# Patient Record
Sex: MALE | Race: WHITE | HISPANIC OR LATINO | URBAN - METROPOLITAN AREA
[De-identification: names, ages, dates, MRNs, and addresses within clinical notes are randomized per-mention and may not be internally consistent; named-entity substitution may affect disease eponyms.]

---

## 2018-11-19 ENCOUNTER — INPATIENT (INPATIENT)
Facility: HOSPITAL | Age: 48
LOS: 2 days | Discharge: HOME | End: 2018-11-22
Attending: INTERNAL MEDICINE | Admitting: INTERNAL MEDICINE
Payer: COMMERCIAL

## 2018-11-19 VITALS
OXYGEN SATURATION: 98 % | DIASTOLIC BLOOD PRESSURE: 121 MMHG | HEART RATE: 112 BPM | SYSTOLIC BLOOD PRESSURE: 159 MMHG | TEMPERATURE: 98 F | RESPIRATION RATE: 20 BRPM

## 2018-11-19 DIAGNOSIS — Z98.890 OTHER SPECIFIED POSTPROCEDURAL STATES: Chronic | ICD-10-CM

## 2018-11-19 DIAGNOSIS — Z29.9 ENCOUNTER FOR PROPHYLACTIC MEASURES, UNSPECIFIED: ICD-10-CM

## 2018-11-19 DIAGNOSIS — I10 ESSENTIAL (PRIMARY) HYPERTENSION: ICD-10-CM

## 2018-11-19 DIAGNOSIS — I50.9 HEART FAILURE, UNSPECIFIED: ICD-10-CM

## 2018-11-19 LAB
ALBUMIN SERPL ELPH-MCNC: 4.3 G/DL — SIGNIFICANT CHANGE UP (ref 3.3–5)
ALP SERPL-CCNC: 71 U/L — SIGNIFICANT CHANGE UP (ref 40–120)
ALT FLD-CCNC: 31 U/L — SIGNIFICANT CHANGE UP (ref 4–41)
AST SERPL-CCNC: 43 U/L — HIGH (ref 4–40)
BASOPHILS # BLD AUTO: 0.01 K/UL — SIGNIFICANT CHANGE UP (ref 0–0.2)
BASOPHILS NFR BLD AUTO: 0.1 % — SIGNIFICANT CHANGE UP (ref 0–2)
BILIRUB SERPL-MCNC: 2 MG/DL — HIGH (ref 0.2–1.2)
BUN SERPL-MCNC: 11 MG/DL — SIGNIFICANT CHANGE UP (ref 7–23)
CALCIUM SERPL-MCNC: 9.6 MG/DL — SIGNIFICANT CHANGE UP (ref 8.4–10.5)
CHLORIDE SERPL-SCNC: 102 MMOL/L — SIGNIFICANT CHANGE UP (ref 98–107)
CO2 SERPL-SCNC: 26 MMOL/L — SIGNIFICANT CHANGE UP (ref 22–31)
CREAT SERPL-MCNC: 1.21 MG/DL — SIGNIFICANT CHANGE UP (ref 0.5–1.3)
EOSINOPHIL # BLD AUTO: 0.02 K/UL — SIGNIFICANT CHANGE UP (ref 0–0.5)
EOSINOPHIL NFR BLD AUTO: 0.3 % — SIGNIFICANT CHANGE UP (ref 0–6)
GLUCOSE SERPL-MCNC: 112 MG/DL — HIGH (ref 70–99)
HCT VFR BLD CALC: 46.7 % — SIGNIFICANT CHANGE UP (ref 39–50)
HGB BLD-MCNC: 15.7 G/DL — SIGNIFICANT CHANGE UP (ref 13–17)
IMM GRANULOCYTES # BLD AUTO: 0.02 # — SIGNIFICANT CHANGE UP
IMM GRANULOCYTES NFR BLD AUTO: 0.3 % — SIGNIFICANT CHANGE UP (ref 0–1.5)
LYMPHOCYTES # BLD AUTO: 1.22 K/UL — SIGNIFICANT CHANGE UP (ref 1–3.3)
LYMPHOCYTES # BLD AUTO: 17.8 % — SIGNIFICANT CHANGE UP (ref 13–44)
MCHC RBC-ENTMCNC: 33.6 % — SIGNIFICANT CHANGE UP (ref 32–36)
MCHC RBC-ENTMCNC: 36.9 PG — HIGH (ref 27–34)
MCV RBC AUTO: 109.9 FL — HIGH (ref 80–100)
MONOCYTES # BLD AUTO: 0.35 K/UL — SIGNIFICANT CHANGE UP (ref 0–0.9)
MONOCYTES NFR BLD AUTO: 5.1 % — SIGNIFICANT CHANGE UP (ref 2–14)
NEUTROPHILS # BLD AUTO: 5.23 K/UL — SIGNIFICANT CHANGE UP (ref 1.8–7.4)
NEUTROPHILS NFR BLD AUTO: 76.4 % — SIGNIFICANT CHANGE UP (ref 43–77)
NRBC # FLD: 0 — SIGNIFICANT CHANGE UP
NT-PROBNP SERPL-SCNC: SIGNIFICANT CHANGE UP PG/ML
PLATELET # BLD AUTO: 193 K/UL — SIGNIFICANT CHANGE UP (ref 150–400)
PMV BLD: 10.1 FL — SIGNIFICANT CHANGE UP (ref 7–13)
POTASSIUM SERPL-MCNC: 3.8 MMOL/L — SIGNIFICANT CHANGE UP (ref 3.5–5.3)
POTASSIUM SERPL-SCNC: 3.8 MMOL/L — SIGNIFICANT CHANGE UP (ref 3.5–5.3)
PROT SERPL-MCNC: 7.5 G/DL — SIGNIFICANT CHANGE UP (ref 6–8.3)
RBC # BLD: 4.25 M/UL — SIGNIFICANT CHANGE UP (ref 4.2–5.8)
RBC # FLD: 16.2 % — HIGH (ref 10.3–14.5)
SODIUM SERPL-SCNC: 141 MMOL/L — SIGNIFICANT CHANGE UP (ref 135–145)
TROPONIN T, HIGH SENSITIVITY: 38 NG/L — SIGNIFICANT CHANGE UP (ref ?–14)
WBC # BLD: 6.85 K/UL — SIGNIFICANT CHANGE UP (ref 3.8–10.5)
WBC # FLD AUTO: 6.85 K/UL — SIGNIFICANT CHANGE UP (ref 3.8–10.5)

## 2018-11-19 PROCEDURE — 71045 X-RAY EXAM CHEST 1 VIEW: CPT | Mod: 26

## 2018-11-19 RX ORDER — CARVEDILOL PHOSPHATE 80 MG/1
6.25 CAPSULE, EXTENDED RELEASE ORAL EVERY 12 HOURS
Qty: 0 | Refills: 0 | Status: DISCONTINUED | OUTPATIENT
Start: 2018-11-19 | End: 2018-11-22

## 2018-11-19 RX ORDER — ZOLPIDEM TARTRATE 10 MG/1
1 TABLET ORAL
Qty: 0 | Refills: 0 | COMMUNITY

## 2018-11-19 RX ORDER — HEPARIN SODIUM 5000 [USP'U]/ML
5000 INJECTION INTRAVENOUS; SUBCUTANEOUS EVERY 8 HOURS
Qty: 0 | Refills: 0 | Status: DISCONTINUED | OUTPATIENT
Start: 2018-11-19 | End: 2018-11-22

## 2018-11-19 RX ORDER — ZOLPIDEM TARTRATE 10 MG/1
5 TABLET ORAL AT BEDTIME
Qty: 0 | Refills: 0 | Status: DISCONTINUED | OUTPATIENT
Start: 2018-11-19 | End: 2018-11-22

## 2018-11-19 RX ORDER — FUROSEMIDE 40 MG
20 TABLET ORAL
Qty: 0 | Refills: 0 | Status: DISCONTINUED | OUTPATIENT
Start: 2018-11-19 | End: 2018-11-20

## 2018-11-19 RX ORDER — NICOTINE POLACRILEX 2 MG
1 GUM BUCCAL DAILY
Qty: 0 | Refills: 0 | Status: DISCONTINUED | OUTPATIENT
Start: 2018-11-19 | End: 2018-11-22

## 2018-11-19 RX ADMIN — CARVEDILOL PHOSPHATE 6.25 MILLIGRAM(S): 80 CAPSULE, EXTENDED RELEASE ORAL at 23:48

## 2018-11-19 NOTE — ED ADULT NURSE NOTE - CHIEF COMPLAINT QUOTE
pt went to cardiologist today for check up and was sent for LV dysfunction. denies chest pain at this time. c.o sob and BLE edema. ambulatory in triage. pmh htn gout

## 2018-11-19 NOTE — H&P ADULT - ASSESSMENT
47 y/o m with h/o HTN presents to the ED for shortness of breath X 3 weeks. Admit to telemetry for acute CHF.

## 2018-11-19 NOTE — H&P ADULT - NSHPLABSRESULTS_GEN_ALL_CORE
LABS:                        15.7   6.85  )-----------( 193      ( 19 Nov 2018 22:00 )             46.7     11-19    141  |  102  |  11  ----------------------------<  112<H>  3.8   |  26  |  1.21    Ca    9.6      19 Nov 2018 22:00    TPro  7.5  /  Alb  4.3  /  TBili  2.0<H>  /  DBili  x   /  AST  43<H>  /  ALT  31  /  AlkPhos  71  11-19        CAPILLARY BLOOD GLUCOSE    EKG shows sinus tachycardia with PVC TWI in AVL

## 2018-11-19 NOTE — H&P ADULT - HISTORY OF PRESENT ILLNESS
49 y/o m with h/o HTN presents to the ED for shortness of breath X 3 weeks. Pt states he has been having shortness of breath when he ties his shoes and with exertion. Pt denies orthopnea or PND. Pt reports he saw his cardiologist today and on the TTE they found have to have new severe LV dysfunction. Pt has swelling in his lower extremities. As per wife, patient has a diet high in salt, drinks and smokes daily and doesn't take his medications. Pt denies LOC, syncope,fever, chills, chest pain, palpitations, dizziness, numbness, tingling, dysuria, urinary/bowel incontinence or any other complaints at this time.

## 2018-11-19 NOTE — ED PROVIDER NOTE - OBJECTIVE STATEMENT
48 Y M with hx of untreated HTN who present with volume overload, SOB, LE edema x 3 weeks. He went to PCP and was eventually diagnosed with LV dysfunction by cardiology. He denies any chest pain, nausea, vomiting, LOC. He says that he only has SOB when he bends over to tie his shoes and when he is walking around. He denies hx of cardiac problems prior to this. He was just started on BP meds sat.

## 2018-11-19 NOTE — ED PROVIDER NOTE - MEDICAL DECISION MAKING DETAILS
48 Y M with new CHF from presumed LV dysfuction sent by cardiologist for admission. Will admit to tele

## 2018-11-19 NOTE — ED ADULT TRIAGE NOTE - CHIEF COMPLAINT QUOTE
pt went to cardiologist today for check up and was sent for LV dysfunction. denies chest pain at this time. c.o sob and BLE edema. ambualtory in triage. pmh htn gout

## 2018-11-19 NOTE — ED PROVIDER NOTE - NOTES
Will accept admission, would like basic labs plus troponin and BNP. Will plan for cath this week. Ok to start coreg x 1 dose now.

## 2018-11-19 NOTE — H&P ADULT - PROBLEM SELECTOR PLAN 1
Admit to telemetry.   Strict I and O, daily weights.   Start lasix 20 mg IV BID and coreg 6.25 mg BID.   Obtain TTE report from Dr. Alejandra office in AM  Pending Cath.   check cbc,tsh,lipid, hemoglobin a1c, bmp with mag and phos.  f/u MD note

## 2018-11-19 NOTE — H&P ADULT - NSHPREVIEWOFSYSTEMS_GEN_ALL_CORE
Constitutional: No fever, fatigue or weight loss.  Skin: No rash.  Eyes: No recent vision problems or eye pain.  ENT: No congestion, ear pain, or sore throat.  Endocrine: No thyroid problems.  Cardiovascular: No chest pain or palpitation.  Respiratory: + SOB. No cough, congestion, or wheezing.  Gastrointestinal: No abdominal pain, nausea, vomiting, or diarrhea.  Genitourinary: No dysuria.  Musculoskeletal: No joint swelling. + LE swelling   Neurologic: No headache.

## 2018-11-19 NOTE — ED ADULT NURSE NOTE - NS ED NURSE REPORT GIVEN DT
no change    will plan for placement of R IJ catheter and ligate L AVF tomorrow (after conversation with Dr. White from [facility])    MEDICATIONS  (STANDING):  aspirin enteric coated 81milliGRAM(s) Oral daily  isosorbide   mononitrate ER Tablet (IMDUR) 60milliGRAM(s) Oral daily  gabapentin 300milliGRAM(s) Oral at bedtime  sertraline 50milliGRAM(s) Oral daily  atorvastatin 10milliGRAM(s) Oral at bedtime  fluorometholone 0.1% Suspension 1Drop(s) Both EYES two times a day  Nephro-mynor 1Tablet(s) Oral daily  heparin  Injectable 5000Unit(s) SubCutaneous every 8 hours  doxercalciferol Injectable 1MICROGram(s) IV Push <User Schedule>  carvedilol 12.5milliGRAM(s) Oral every 12 hours  epoetin hazel Injectable 8000Unit(s) IV Push <User Schedule>    MEDICATIONS  (PRN):  acetaminophen   Tablet 650milliGRAM(s) Oral every 6 hours PRN For Temp greater than 38 C (100.4 F)  acetaminophen   Tablet. 650milliGRAM(s) Oral every 6 hours PRN Mild Pain (1 - 3)  metoclopramide Injectable 10milliGRAM(s) IV Push once PRN Nausea and/or Vomiting  senna 2Tablet(s) Oral at bedtime PRN Constipation  docusate sodium 100milliGRAM(s) Oral three times a day PRN Constipation      Allergies    No Known Allergies    Intolerances        Flatus: [ ] YES [ ] NO             Bowel Movement: [ ] YES [ ] NO  Pain (0-10):            Pain Control Adequate: [ ] YES [ ] NO  Nausea: [ ] YES [ ] NO            Vomiting: [ ] YES [ ] NO  Diarrhea: [ ] YES [ ] NO         Constipation: [ ] YES [ ] NO     Chest Pain: [ ] YES [ ] NO    SOB:  [ ] YES [ ] NO    Vital Signs Last 24 Hrs  T(C): 36.8, Max: 37.8 (05-11 @ 16:57)  T(F): 98.3, Max: 100.1 (05-11 @ 16:57)  HR: 61 (60 - 71)  BP: 168/39 (67/46 - 178/46)  BP(mean): 77 (64 - 88)  RR: 18 (13 - 21)  SpO2: 98% (92% - 100%)    I&O's Summary    I & Os for current day (as of 12 May 2017 07:45)  =============================================  IN: 100 ml / OUT: 0 ml / NET: 100 ml      Physical Exam:  General: NAD, resting comfortably  Pulmonary: normal resp effort, CTA-B  Cardiovascular: NSR  Abdominal: soft, NT/ND  Extremities: WWP, normal strength  Neuro: A/O x 3, CNs II-XII grossly intact, normal motor/sensation, no focal deficits  Pulses:   Right:                                                                          Left:  FEM [ ]2+ [ ]1+ [ ]doppler                                             FEM [ ]2+ [ ]1+ [ ]doppler    POP [ ]2+ [ ]1+ [ ]doppler                                             POP [ ]2+ [ ]1+ [ ]doppler    DP [ ]2+ [ ]1+ [ ]doppler                                                DP [ ]2+ [ ]1+ [ ]doppler  PT[ ]2+ [ ]1+ [ ]doppler                                                  PT [ ]2+ [ ]1+ [ ]doppler    LABS:                        10.3   5.4   )-----------( 142      ( 11 May 2017 05:43 )             33.9     05-11    137  |  101  |  64<H>  ----------------------------<  133<H>  4.7   |  25  |  5.88<H>    Ca    8.6      11 May 2017 05:43  Phos  4.4     05-11    TPro  x   /  Alb  3.0<L>  /  TBili  x   /  DBili  x   /  AST  x   /  ALT  x   /  AlkPhos  x   05-11        LIVER FUNCTIONS - ( 11 May 2017 05:43 )  Alb: 3.0 g/dL / Pro: x     / ALK PHOS: x     / ALT: x     / AST: x     / GGT: x           CAPILLARY BLOOD GLUCOSE  120 (12 May 2017 05:23)      RADIOLOGY & ADDITIONAL TESTS: 20-Nov-2018 02:49

## 2018-11-19 NOTE — ED ADULT NURSE NOTE - NSIMPLEMENTINTERV_GEN_ALL_ED
Implemented All Universal Safety Interventions:  [place] to call system. Call bell, personal items and telephone within reach. Instruct patient to call for assistance. Room bathroom lighting operational. Non-slip footwear when patient is off stretcher. Physically safe environment: no spills, clutter or unnecessary equipment. Stretcher in lowest position, wheels locked, appropriate side rails in place.

## 2018-11-19 NOTE — ED PROVIDER NOTE - ATTENDING CONTRIBUTION TO CARE
Attending note:   After face to face evaluation of this patient, I concur with above noted hx, pe, and care plan for this patient.  47 y/o M with htn, poor po compliance, sob with leg swelling for a month with echo today showing low EF.    Sent to ED for admit to Dr. Alejandra.

## 2018-11-19 NOTE — H&P ADULT - NSHPPHYSICALEXAM_GEN_ALL_CORE
GENERAL APPEARANCE: Well developed, well nourished, alert and cooperative, and appears to be in no acute distress.  HEAD: normocephalic.  EYES: PERRL, EOMI.   EARS: External auditory canals clear, hearing grossly intact.  NECK: Neck supple, non-tender without lymphadenopathy, masses or thyromegaly.  CARDIAC: Normal S1 and S2. No S3, S4 or murmurs. Rhythm is regular.   LUNGS: Clear to auscultation without rales, rhonchi, wheezing or diminished breath sounds.  ABDOMEN: Positive bowel sounds. Soft, nondistended, nontender. No guarding or rebound. No masses.  EXTREMITIES: No significant deformity or joint abnormality. 3+ edema. Peripheral pulses intact.   SKIN: Skin normal color, texture and turgor with no lesions or eruptions.  PSYCHIATRIC: The mental examination revealed the patient was oriented to person, place, and time. The patient was able to demonstrate good judgement and reason, without hallucinations, abnormal affect or abnormal behaviors during the examination. Patient is not suicidal.

## 2018-11-19 NOTE — H&P ADULT - NSHPSOCIALHISTORY_GEN_ALL_CORE
Pt is a daily smoker. Smokes a pack and half a day for 30 years.   Pt is a daily drinker. Pt drinks about 4 shots of vodka a day with ginger ale.   Pt denies drug use.

## 2018-11-19 NOTE — ED ADULT NURSE NOTE - OBJECTIVE STATEMENT
Pt received to rm 26 aaox4 ambulatory sent by cardiologist for evidence of LV HF on CTA.  Pt denies CP SOB dizziness numbness tingling weakness NV diaphoresis headache.  Pt p/w NAD breaths even unlabored skin warm dry intact NSR on monitor 20 g IV access obtained at L.AC labs as ordered.  Will monitor.

## 2018-11-20 LAB
BUN SERPL-MCNC: 12 MG/DL — SIGNIFICANT CHANGE UP (ref 7–23)
CALCIUM SERPL-MCNC: 8.8 MG/DL — SIGNIFICANT CHANGE UP (ref 8.4–10.5)
CHLORIDE SERPL-SCNC: 103 MMOL/L — SIGNIFICANT CHANGE UP (ref 98–107)
CHOLEST SERPL-MCNC: 114 MG/DL — LOW (ref 120–199)
CK MB BLD-MCNC: 2.87 NG/ML — SIGNIFICANT CHANGE UP (ref 1–6.6)
CK MB BLD-MCNC: 3.05 NG/ML — SIGNIFICANT CHANGE UP (ref 1–6.6)
CK SERPL-CCNC: 83 U/L — SIGNIFICANT CHANGE UP (ref 30–200)
CK SERPL-CCNC: 89 U/L — SIGNIFICANT CHANGE UP (ref 30–200)
CO2 SERPL-SCNC: 21 MMOL/L — LOW (ref 22–31)
CREAT SERPL-MCNC: 1.18 MG/DL — SIGNIFICANT CHANGE UP (ref 0.5–1.3)
GLUCOSE SERPL-MCNC: 96 MG/DL — SIGNIFICANT CHANGE UP (ref 70–99)
HBA1C BLD-MCNC: 5.1 % — SIGNIFICANT CHANGE UP (ref 4–5.6)
HCT VFR BLD CALC: 45.9 % — SIGNIFICANT CHANGE UP (ref 39–50)
HDLC SERPL-MCNC: 43 MG/DL — SIGNIFICANT CHANGE UP (ref 35–55)
HGB BLD-MCNC: 15.8 G/DL — SIGNIFICANT CHANGE UP (ref 13–17)
LIPID PNL WITH DIRECT LDL SERPL: 63 MG/DL — SIGNIFICANT CHANGE UP
MAGNESIUM SERPL-MCNC: 1.8 MG/DL — SIGNIFICANT CHANGE UP (ref 1.6–2.6)
MCHC RBC-ENTMCNC: 34.4 % — SIGNIFICANT CHANGE UP (ref 32–36)
MCHC RBC-ENTMCNC: 37.3 PG — HIGH (ref 27–34)
MCV RBC AUTO: 108.3 FL — HIGH (ref 80–100)
NRBC # FLD: 0 — SIGNIFICANT CHANGE UP
PHOSPHATE SERPL-MCNC: 3 MG/DL — SIGNIFICANT CHANGE UP (ref 2.5–4.5)
PLATELET # BLD AUTO: 183 K/UL — SIGNIFICANT CHANGE UP (ref 150–400)
PMV BLD: 10.3 FL — SIGNIFICANT CHANGE UP (ref 7–13)
POTASSIUM SERPL-MCNC: 3.6 MMOL/L — SIGNIFICANT CHANGE UP (ref 3.5–5.3)
POTASSIUM SERPL-SCNC: 3.6 MMOL/L — SIGNIFICANT CHANGE UP (ref 3.5–5.3)
RBC # BLD: 4.24 M/UL — SIGNIFICANT CHANGE UP (ref 4.2–5.8)
RBC # FLD: 16.5 % — HIGH (ref 10.3–14.5)
SODIUM SERPL-SCNC: 141 MMOL/L — SIGNIFICANT CHANGE UP (ref 135–145)
TRIGL SERPL-MCNC: 79 MG/DL — SIGNIFICANT CHANGE UP (ref 10–149)
TROPONIN T, HIGH SENSITIVITY: 35 NG/L — SIGNIFICANT CHANGE UP (ref ?–14)
TROPONIN T, HIGH SENSITIVITY: 41 NG/L — SIGNIFICANT CHANGE UP (ref ?–14)
TSH SERPL-MCNC: 3.23 UIU/ML — SIGNIFICANT CHANGE UP (ref 0.27–4.2)
WBC # BLD: 6.46 K/UL — SIGNIFICANT CHANGE UP (ref 3.8–10.5)
WBC # FLD AUTO: 6.46 K/UL — SIGNIFICANT CHANGE UP (ref 3.8–10.5)

## 2018-11-20 RX ORDER — LABETALOL HCL 100 MG
10 TABLET ORAL ONCE
Qty: 0 | Refills: 0 | Status: COMPLETED | OUTPATIENT
Start: 2018-11-20 | End: 2018-11-20

## 2018-11-20 RX ORDER — LISINOPRIL 2.5 MG/1
5 TABLET ORAL DAILY
Qty: 0 | Refills: 0 | Status: DISCONTINUED | OUTPATIENT
Start: 2018-11-20 | End: 2018-11-21

## 2018-11-20 RX ORDER — FUROSEMIDE 40 MG
40 TABLET ORAL
Qty: 0 | Refills: 0 | Status: DISCONTINUED | OUTPATIENT
Start: 2018-11-20 | End: 2018-11-22

## 2018-11-20 RX ADMIN — CARVEDILOL PHOSPHATE 6.25 MILLIGRAM(S): 80 CAPSULE, EXTENDED RELEASE ORAL at 05:59

## 2018-11-20 RX ADMIN — Medication 40 MILLIGRAM(S): at 18:34

## 2018-11-20 RX ADMIN — Medication 1 PATCH: at 19:17

## 2018-11-20 RX ADMIN — Medication 20 MILLIGRAM(S): at 05:59

## 2018-11-20 RX ADMIN — HEPARIN SODIUM 5000 UNIT(S): 5000 INJECTION INTRAVENOUS; SUBCUTANEOUS at 22:02

## 2018-11-20 RX ADMIN — Medication 10 MILLIGRAM(S): at 15:38

## 2018-11-20 RX ADMIN — ZOLPIDEM TARTRATE 5 MILLIGRAM(S): 10 TABLET ORAL at 03:07

## 2018-11-20 RX ADMIN — CARVEDILOL PHOSPHATE 6.25 MILLIGRAM(S): 80 CAPSULE, EXTENDED RELEASE ORAL at 18:34

## 2018-11-20 RX ADMIN — Medication 1 PATCH: at 12:00

## 2018-11-20 RX ADMIN — Medication 2 MILLIGRAM(S): at 13:26

## 2018-11-20 RX ADMIN — HEPARIN SODIUM 5000 UNIT(S): 5000 INJECTION INTRAVENOUS; SUBCUTANEOUS at 05:59

## 2018-11-20 NOTE — ED ADULT NURSE REASSESSMENT NOTE - NS ED NURSE REASSESS COMMENT FT1
repeat labs sent, pt A&Ox4, denies chest pain, SOB, respirations equal and non labored, pt endorses no other complaints. normal sinus with frequent PVCs on tele monitor, comfort measures provided, will continue to monitor.

## 2018-11-20 NOTE — PROGRESS NOTE ADULT - SUBJECTIVE AND OBJECTIVE BOX
CARDIOLOGY FOLLOW UP - Dr. James    CC  see full h/p   no cp or sob         PHYSICAL EXAM:  T(C): 36.4 (11-20-18 @ 05:51), Max: 36.8 (11-19-18 @ 20:37)  HR: 89 (11-20-18 @ 09:51) (88 - 112)  BP: 126/98 (11-20-18 @ 09:51) (126/98 - 159/121)  RR: 18 (11-20-18 @ 09:51) (18 - 20)  SpO2: 99% (11-20-18 @ 09:51) (96% - 99%)  Wt(kg): --  I&O's Summary    19 Nov 2018 07:01  -  20 Nov 2018 07:00  --------------------------------------------------------  IN: 0 mL / OUT: 250 mL / NET: -250 mL    20 Nov 2018 07:01  -  20 Nov 2018 12:29  --------------------------------------------------------  IN: 120 mL / OUT: 800 mL / NET: -680 mL        Appearance: Normal	  Cardiovascular: Normal S1 S2,RRR, No JVD, No murmurs  Respiratory: fine crackles bl   Gastrointestinal:  Soft, Non-tender, + BS	  Extremities: +2 LE edema        MEDICATIONS  (STANDING):  carvedilol 6.25 milliGRAM(s) Oral every 12 hours  furosemide   Injectable 20 milliGRAM(s) IV Push two times a day  heparin  Injectable 5000 Unit(s) SubCutaneous every 8 hours  nicotine - 21 mG/24Hr(s) Patch 1 Patch Transdermal daily      TELEMETRY: sinus tachycardia   	    ECG:  	  RADIOLOGY:   DIAGNOSTIC TESTING:  [ ] Echocardiogram:  [ ]  Catheterization:  [ ] Stress Test:    OTHER: 	  < from: Xray Chest 1 View- PORTABLE-Urgent (11.19.18 @ 22:29) >    IMPRESSION:    Pulmonary edema. Cardiomegaly.    < end of copied text >    LABS:	 	        CKMB: 3.05 ng/mL (11-20 @ 06:00)  CKMB: 2.87 ng/mL (11-20 @ 01:40)                          15.8   6.46  )-----------( 183      ( 20 Nov 2018 06:00 )             45.9     11-20    141  |  103  |  12  ----------------------------<  96  3.6   |  21<L>  |  1.18    Ca    8.8      20 Nov 2018 06:00  Phos  3.0     11-20  Mg     1.8     11-20    TPro  7.5  /  Alb  4.3  /  TBili  2.0<H>  /  DBili  x   /  AST  43<H>  /  ALT  31  /  AlkPhos  71  11-19

## 2018-11-20 NOTE — ED ADULT NURSE REASSESSMENT NOTE - NS ED NURSE REASSESS COMMENT FT1
report given to ADALGISA Sanz. repeat vitals as noted. respirations equal and non labored. lungs clear, pt denies chest pain, SOB at this time. normal sinus with PVCs on tele monitor, pending transport.

## 2018-11-20 NOTE — PROGRESS NOTE ADULT - ASSESSMENT
Echo 11/19/2018: mild- mod mr, mild- mod TR. Severe global LV sys dsyfx. EF 20%  Stress test 11/19/2018: patchy decrease uptake, can not rule out ischemia, Severe global LV dysfx EF 18%    a/p  47 y/o male + ETOH and + smoker with h/o HTN presents with shortness of breath     1. Acute systolic heart failure   outpt echo and stress reviewed . pt admitted with new global LV dysfx.  mild fluid overloaded on exam, SOB improved with diuresis   continue with lasix 20 mg IVP BID   continue with coreg 6.25 mg BID   start lisinopril 2.5 mg Daily   plan for cath today r/o ischemic heart disease   check LE dopplers     2. HTN   bp slowly improving   add acei     3. Smoking cessation offered   + ETOH use. Start CIWA protocol     dvt ppx Echo 11/19/2018: mild- mod mr, mild- mod TR. Severe global LV sys dsyfx. EF 20%  Stress test 11/19/2018: patchy decrease uptake, can not rule out ischemia, Severe global LV dysfx EF 18%    a/p  47 y/o male + ETOH and + smoker with h/o HTN presents with shortness of breath     1. Acute systolic heart failure   outpt echo and stress reviewed . pt admitted with new global LV dysfx.  mild fluid overloaded on exam, SOB improved with diuresis   continue with lasix 20 mg IVP BID   continue with coreg 6.25 mg BID   start lisinopril 2.5 mg Daily   plan for cath today r/o ischemic heart disease as cause of cardiomyopathy  check LE dopplers     2. HTN   bp slowly improving   add acei     3. Smoking cessation offered   + ETOH use. Start CIWA protocol     dvt ppx Echo 11/19/2018: mild- mod mr, mild- mod TR. Severe global LV sys dsyfx. EF 20%  Stress test 11/19/2018: patchy decrease uptake, can not rule out ischemia, Severe global LV dysfx EF 18%    a/p  49 y/o male + ETOH and + smoker with h/o HTN presents with shortness of breath     1. Acute systolic heart failure   outpt echo and stress reviewed . pt admitted with new global LV dysfx.  mild fluid overloaded on exam, SOB improved with diuresis   continue with lasix 20 mg IVP BID   continue with coreg 6.25 mg BID   start lisinopril 5 mg Daily   plan for cath today r/o ischemic heart disease as cause of cardiomyopathy  check LE dopplers     2. HTN   bp slowly improving   add acei     3. Smoking cessation offered   + ETOH use. Start CIWA protocol     dvt ppx

## 2018-11-20 NOTE — PHYSICAL THERAPY INITIAL EVALUATION ADULT - CRITERIA FOR SKILLED THERAPEUTIC INTERVENTIONS
anticipated discharge recommendation/therapy frequency/predicted duration of therapy intervention/impairments found/rehab potential

## 2018-11-20 NOTE — PHYSICAL THERAPY INITIAL EVALUATION ADULT - GENERAL OBSERVATIONS, REHAB EVAL
Consult received, chart reviewed. Patient received supine in bed, NAD, +tele. Patient agreed to Evaluation from Physical Therapist.

## 2018-11-20 NOTE — PHYSICAL THERAPY INITIAL EVALUATION ADULT - LEVEL OF INDEPENDENCE: SIT/STAND, REHAB EVAL
To be assessed. Pt. deferred, breakfast had just arrived and pt. stated he wanted to eat his breakfast.

## 2018-11-21 LAB
BUN SERPL-MCNC: 15 MG/DL — SIGNIFICANT CHANGE UP (ref 7–23)
CALCIUM SERPL-MCNC: 9.4 MG/DL — SIGNIFICANT CHANGE UP (ref 8.4–10.5)
CHLORIDE SERPL-SCNC: 99 MMOL/L — SIGNIFICANT CHANGE UP (ref 98–107)
CO2 SERPL-SCNC: 25 MMOL/L — SIGNIFICANT CHANGE UP (ref 22–31)
CREAT SERPL-MCNC: 1.23 MG/DL — SIGNIFICANT CHANGE UP (ref 0.5–1.3)
GLUCOSE SERPL-MCNC: 105 MG/DL — HIGH (ref 70–99)
HCT VFR BLD CALC: 49.9 % — SIGNIFICANT CHANGE UP (ref 39–50)
HGB BLD-MCNC: 17.5 G/DL — HIGH (ref 13–17)
MAGNESIUM SERPL-MCNC: 1.9 MG/DL — SIGNIFICANT CHANGE UP (ref 1.6–2.6)
MCHC RBC-ENTMCNC: 35.1 % — SIGNIFICANT CHANGE UP (ref 32–36)
MCHC RBC-ENTMCNC: 37.6 PG — HIGH (ref 27–34)
MCV RBC AUTO: 107.1 FL — HIGH (ref 80–100)
NRBC # FLD: 0 — SIGNIFICANT CHANGE UP
PLATELET # BLD AUTO: 177 K/UL — SIGNIFICANT CHANGE UP (ref 150–400)
PMV BLD: 10.2 FL — SIGNIFICANT CHANGE UP (ref 7–13)
POTASSIUM SERPL-MCNC: 3.7 MMOL/L — SIGNIFICANT CHANGE UP (ref 3.5–5.3)
POTASSIUM SERPL-SCNC: 3.7 MMOL/L — SIGNIFICANT CHANGE UP (ref 3.5–5.3)
RBC # BLD: 4.66 M/UL — SIGNIFICANT CHANGE UP (ref 4.2–5.8)
RBC # FLD: 16 % — HIGH (ref 10.3–14.5)
SODIUM SERPL-SCNC: 141 MMOL/L — SIGNIFICANT CHANGE UP (ref 135–145)
WBC # BLD: 6.99 K/UL — SIGNIFICANT CHANGE UP (ref 3.8–10.5)
WBC # FLD AUTO: 6.99 K/UL — SIGNIFICANT CHANGE UP (ref 3.8–10.5)

## 2018-11-21 PROCEDURE — 99222 1ST HOSP IP/OBS MODERATE 55: CPT

## 2018-11-21 RX ORDER — LISINOPRIL 2.5 MG/1
10 TABLET ORAL DAILY
Qty: 0 | Refills: 0 | Status: DISCONTINUED | OUTPATIENT
Start: 2018-11-22 | End: 2018-11-22

## 2018-11-21 RX ORDER — MAGNESIUM OXIDE 400 MG ORAL TABLET 241.3 MG
400 TABLET ORAL
Qty: 0 | Refills: 0 | Status: DISCONTINUED | OUTPATIENT
Start: 2018-11-21 | End: 2018-11-22

## 2018-11-21 RX ORDER — POTASSIUM CHLORIDE 20 MEQ
40 PACKET (EA) ORAL ONCE
Qty: 0 | Refills: 0 | Status: COMPLETED | OUTPATIENT
Start: 2018-11-21 | End: 2018-11-21

## 2018-11-21 RX ORDER — FLUTICASONE PROPIONATE 50 MCG
1 SPRAY, SUSPENSION NASAL
Qty: 0 | Refills: 0 | Status: DISCONTINUED | OUTPATIENT
Start: 2018-11-21 | End: 2018-11-22

## 2018-11-21 RX ORDER — SODIUM CHLORIDE 0.65 %
1 AEROSOL, SPRAY (ML) NASAL
Qty: 0 | Refills: 0 | Status: DISCONTINUED | OUTPATIENT
Start: 2018-11-21 | End: 2018-11-22

## 2018-11-21 RX ADMIN — HEPARIN SODIUM 5000 UNIT(S): 5000 INJECTION INTRAVENOUS; SUBCUTANEOUS at 05:46

## 2018-11-21 RX ADMIN — Medication 2 MILLIGRAM(S): at 09:54

## 2018-11-21 RX ADMIN — Medication 1 PATCH: at 11:32

## 2018-11-21 RX ADMIN — HEPARIN SODIUM 5000 UNIT(S): 5000 INJECTION INTRAVENOUS; SUBCUTANEOUS at 21:40

## 2018-11-21 RX ADMIN — LISINOPRIL 5 MILLIGRAM(S): 2.5 TABLET ORAL at 05:46

## 2018-11-21 RX ADMIN — Medication 40 MILLIGRAM(S): at 17:06

## 2018-11-21 RX ADMIN — Medication 1 PATCH: at 11:33

## 2018-11-21 RX ADMIN — CARVEDILOL PHOSPHATE 6.25 MILLIGRAM(S): 80 CAPSULE, EXTENDED RELEASE ORAL at 17:06

## 2018-11-21 RX ADMIN — Medication 1 SPRAY(S): at 05:46

## 2018-11-21 RX ADMIN — Medication 1 PATCH: at 19:42

## 2018-11-21 RX ADMIN — Medication 1 SPRAY(S): at 17:06

## 2018-11-21 RX ADMIN — ZOLPIDEM TARTRATE 5 MILLIGRAM(S): 10 TABLET ORAL at 00:46

## 2018-11-21 RX ADMIN — Medication 40 MILLIEQUIVALENT(S): at 11:32

## 2018-11-21 RX ADMIN — Medication 1 PATCH: at 06:54

## 2018-11-21 RX ADMIN — Medication 40 MILLIGRAM(S): at 05:46

## 2018-11-21 RX ADMIN — MAGNESIUM OXIDE 400 MG ORAL TABLET 400 MILLIGRAM(S): 241.3 TABLET ORAL at 11:32

## 2018-11-21 RX ADMIN — MAGNESIUM OXIDE 400 MG ORAL TABLET 400 MILLIGRAM(S): 241.3 TABLET ORAL at 17:07

## 2018-11-21 RX ADMIN — CARVEDILOL PHOSPHATE 6.25 MILLIGRAM(S): 80 CAPSULE, EXTENDED RELEASE ORAL at 05:46

## 2018-11-21 NOTE — PROGRESS NOTE ADULT - SUBJECTIVE AND OBJECTIVE BOX
CARDIOLOGY FOLLOW UP - Dr. James    CC no cp or sob  feels shaky       PHYSICAL EXAM:  T(C): 36.4 (18 @ 05:40), Max: 36.6 (18 @ 20:00)  HR: 77 (18 @ 05:40) (72 - 79)  BP: 142/99 (18 @ 05:40) (131/99 - 144/95)  RR: 18 (18 @ 05:40) (18 - 18)  SpO2: 100% (18 @ 05:40) (95% - 100%)  Wt(kg): --  I&O's Summary    2018 07:  -  2018 07:00  --------------------------------------------------------  IN: 320 mL / OUT: 1800 mL / NET: -1480 mL    2018 07:01  -  2018 09:56  --------------------------------------------------------  IN: 0 mL / OUT: 300 mL / NET: -300 mL        Appearance: Normal	  Cardiovascular: Normal S1 S2,RRR, No JVD, No murmurs  Respiratory: fine crackles bl at base   Gastrointestinal:  Soft, Non-tender, + BS	  Extremities: Normal range of motion, No clubbing, cyanosis or edema  right groin dressing CDI, + ecchymosis, no hematoma, + pulses bl LE        MEDICATIONS  (STANDING):  carvedilol 6.25 milliGRAM(s) Oral every 12 hours  furosemide   Injectable 40 milliGRAM(s) IV Push two times a day  heparin  Injectable 5000 Unit(s) SubCutaneous every 8 hours  lisinopril 5 milliGRAM(s) Oral daily  magnesium oxide 400 milliGRAM(s) Oral three times a day with meals  nicotine - 21 mG/24Hr(s) Patch 1 Patch Transdermal daily  potassium chloride    Tablet ER 40 milliEquivalent(s) Oral once  sodium chloride 0.65% Nasal 1 Spray(s) Both Nostrils four times a day      TELEMETRY: NSR/ PVC, 7 beats WCTS 	    ECG:  	  RADIOLOGY:   DIAGNOSTIC TESTING:  [ ] Echocardiogram:  [ ]  Catheterization:  < from: Cardiac Cath Lab - Adult (18 @ 14:46) >  CORONARY VESSELS: The coronary circulation is left dominant.  LM:   --  LM: Normal.  LAD:   --  LAD: Normal.  CX:   --  Circumflex: Normal.  RCA:   --  Proximal RCA: There was a 20 % stenosis.  COMPLICATIONS: There were no complications.  SUMMARY:  HEMODYNAMICS: Hemodynamic assessment demonstrates moderately to severely  elevated LVEDP.  DIAGNOSTIC IMPRESSIONS: Mild nonobstructive coronary artery disease of the  RCA.  Severe nonischemic cardiomyopathy.  New onset systolic heart failure.  DIAGNOSTIC RECOMMENDATIONS: Medical management of congestive heart failure  and left ventricular dysfunction.   the patient on tobacco and ETOH cessation.  Aggressive risk factor modification.  INTERVENTIONAL IMPRESSIONS: Mild nonobstructive coronary artery disease of  the RCA.  Severe nonischemic cardiomyopathy.  New onset systolic heart failure.  INTERVENTIONAL RECOMMENDATIONS: Medical management of congestive heart  failure and left ventricular dysfunction.   the patient on tobacco and ETOH cessation.  Aggressive risk factor modification.  DISPOSITION: The patient left the catheterization laboratory in stable  condition.  Prepared and signed by  Mahesh James M.D.  Signed 2018 16:12:27  HEMODYNAMIC TABLES  Pressures:  Baseline  Pressures:  - HR: 88  Pressures:  - Rhythm:  Pressures:  -- Aortic Pressure (S/D/M): 145/113/125  Pressures:  -- Left Ventricle (s/edp): 158/39/--  Outputs:  Baseline  Outputs:  -- CALCULATIONS[age]  Outputs:  -- CALCULATIONS: Body Surface Area: 2.33  Outputs:  -- CALCULATIONS: Height in cm: 183.00  Outputs:  -- CALCULATIONS: Sex: Male  Outputs:  -- CALCULATIONS: Weight in k.00  Outputs:  -- OUTPUTS: O2 consumption: 291.39  Outputs:  -- OUTPUTS: Vo2 Indexed: 125.00    < end of copied text >    [ ] Stress Test:    OTHER: 	    LABS:	 	                                17.5   6.99  )-----------( 177      ( 2018 06:30 )             49.9     11-    141  |  99  |  15  ----------------------------<  105<H>  3.7   |  25  |  1.23    Ca    9.4      2018 06:30  Phos  3.0     11-20  Mg     1.9     -    TPro  7.5  /  Alb  4.3  /  TBili  2.0<H>  /  DBili  x   /  AST  43<H>  /  ALT  31  /  AlkPhos  71

## 2018-11-21 NOTE — PROGRESS NOTE ADULT - ASSESSMENT
Echo 11/19/2018: mild- mod mr, mild- mod TR. Severe global LV sys dsyfx. EF 20%  Stress test 11/19/2018: patchy decrease uptake, can not rule out ischemia, Severe global LV dysfx EF 18%    a/p  47 y/o male + ETOH and + smoker with h/o HTN presents with shortness of breath     1. Acute systolic heart failure, NonICMP   s/p Cath revealing mild nonobstructive CAD of RCA, sev nonicmp, LVEDP 35,  continue with coreg 6.25 mg BID and lisinopril   lasix increased to 40 mg IVP BID   clinically improving , will likely change to Po tomorrow   pending EP eval for life vest eval     2. HTN   elevated diastolic reading noted   increase lisinopril to 10 mg Daily     3. Smoking cessation/ ETOH cessation offered   continue  [facility] protocol     dvt ppx

## 2018-11-21 NOTE — CONSULT NOTE ADULT - ASSESSMENT
This is a 49 yo male with uncontrolled hypertension admitted with acute systolic heart failure and new nonischemic cardiomyopathy with   EF 18-20%.  Started on optimal medical therapy.  EKG and telemetry with normal sinus rhythm with occasional PVC's.  Patient clinically improved with IV Lasix although still unable to lay flat without coughing. EKG and telemetry with occasional PVC's and 7 beats of NSVT vs SVT. No syncope or palpitations by history.  We have spoken to the patient about wearing a Life Vest during the next three months for prevention of sudden cardiac death.  After three months of OMT he would be reevaluated with another echocardiogram to determine LVEF.  If at that time > 35%, would not recommend an ICD.  If less that 35%, would consider ICD for primary prevention of sudden cardiac death.  I have discussed the risks, benefits and alternatives to a Life Vest with the patient and he remains undecided. I have spoken with his wife Michelle who is also undecided.  In the meantime, April Stevens, the Life Vest Representative has been contacted and will verify insurance authorization.

## 2018-11-21 NOTE — CHART NOTE - NSCHARTNOTEFT_GEN_A_CORE
Pt is s/p 11/20 LHC: normal coronaries, LVEDP 35, RFA accessed    Pt lying in bed, no complaints    Right groin with clean and dry bandage in place, some ecchymosis noted medially and superiorly but no marked tenderness or swelling.  Right leg warm to touch.    Plan:  Stable, monitor or bleeding

## 2018-11-21 NOTE — CONSULT NOTE ADULT - SUBJECTIVE AND OBJECTIVE BOX
Patient is a 49yo male with past medical history of hypertension and right arm embolectomy who presented with progressive dyspnea x 3 weeks. He denies chest pain, palpitations, syncope or lightheadedness. Echocardiogram showed severe LV dysfunction with an LVEF of 20%.        PAST MEDICAL & SURGICAL HISTORY:  Hypertension  History of embolectomy: in the right arm      HPI:                    MEDICATIONS  (STANDING):  carvedilol 6.25 milliGRAM(s) Oral every 12 hours  furosemide   Injectable 40 milliGRAM(s) IV Push two times a day  heparin  Injectable 5000 Unit(s) SubCutaneous every 8 hours  magnesium oxide 400 milliGRAM(s) Oral three times a day with meals  nicotine - 21 mG/24Hr(s) Patch 1 Patch Transdermal daily  sodium chloride 0.65% Nasal 1 Spray(s) Both Nostrils four times a day    MEDICATIONS  (PRN):  LORazepam   Injectable 2 milliGRAM(s) IV Push every 2 hours PRN CIWA-Ar score increase by 2 points and a total score of 7 or less  zolpidem 5 milliGRAM(s) Oral at bedtime PRN Insomnia      FAMILY HISTORY:  No pertinent family history in first degree relatives      SOCIAL HISTORY:    CIGARETTES:    ALCOHOL:    REVIEW OF SYSTEMS:    CONSTITUTIONAL: No fever, weight loss, chills, shakes, or fatigue  EYES: No eye pain, visual disturbances, or discharge  ENMT:  No difficulty hearing, tinnitus, vertigo; No sinus or throat pain  NECK: No pain or stiffness  BREASTS: No pain, masses, or nipple discharge  RESPIRATORY: No cough, wheezing, hemoptysis, or shortness of breath  CARDIOVASCULAR: No chest pain, dyspnea, palpitations, dizziness, syncope, paroxysmal nocturnal dyspnea, orthopnea, or arm or leg swelling  GASTROINTESTINAL: No abdominal  or epigastric pain, nausea, vomiting, hematemesis, diarrhea, constipation, melena or bright red blood.  GENITOURINARY: No dysuria, nocturia, hematuria, or urinary incontinence  NEUROLOGICAL: No headaches, memory loss, slurred speech, limb weakness, loss of strength, numbness, or tremors  SKIN: No itching, burning, rashes, or lesions   LYMPH NODES: No enlarged glands  ENDOCRINE: No heat or cold intolerance, or hair loss  MUSCULOSKELETAL: No joint pain or swelling, muscle, back, or extremity pain  PSYCHIATRIC: No depression, anxiety, or difficulty sleeping  HEME/LYMPH: No easy bruising or bleeding gums  ALLERY AND IMMUNOLOGIC: No hives or rash.      Vital Signs Last 24 Hrs  T(C): 36.7 (2018 16:00), Max: 36.7 (2018 09:40)  T(F): 98.1 (2018 16:00), Max: 98.1 (2018 16:00)  HR: 74 (2018 17:05) (73 - 89)  BP: 114/84 (2018 17:05) (114/84 - 144/95)  BP(mean): --  RR: 16 (2018 16:00) (16 - 18)  SpO2: 98% (2018 16:00) (95% - 100%)    PHYSICAL EXAM:    GENERAL: In no apparent distress, well nourished, and hydrated.  HEAD:  Atraumatic, Normocephalic  EYES: EOMI, PERRLA, conjunctiva and sclera clear  ENMT: No tonsillar erythema, exudates, or enlargement; Moist mucous membranes, Good dentition, No lesions  NECK: Supple and normal thyroid.  No JVD or carotid bruit.  Carotid pulse is 2+ bilaterally.  HEART: Regular rate and rhythm; No murmurs, rubs, or gallops.  PULMONARY: Clear to auscultation and perfusion.  No rales, wheezing, or rhonchi bilaterally.  ABDOMEN: Soft, Nontender, Nondistended; Bowel sounds present  EXTREMITIES:  2+ Peripheral Pulses, No clubbing, cyanosis, or edema  LYMPH: No lymphadenopathy noted  NEUROLOGICAL: Grossly nonfocal          INTERPRETATION OF TELEMETRY:    ECG:    I&O's Detail    2018 07:  -  2018 07:00  --------------------------------------------------------  IN:    Oral Fluid: 320 mL  Total IN: 320 mL    OUT:    Voided: 1800 mL  Total OUT: 1800 mL    Total NET: -1480 mL      2018 07:01  -  2018 17:56  --------------------------------------------------------  IN:  Total IN: 0 mL    OUT:    Voided: 700 mL  Total OUT: 700 mL    Total NET: -700 mL          LABS:                        17.5   6.99  )-----------( 177      ( 2018 06:30 )             49.9     -21    141  |  99  |  15  ----------------------------<  105<H>  3.7   |  25  |  1.23    Ca    9.4      2018 06:30  Phos  3.0     11-20  Mg     1.9         TPro  7.5  /  Alb  4.3  /  TBili  2.0<H>  /  DBili  x   /  AST  43<H>  /  ALT  31  /  AlkPhos  71  1119    CARDIAC MARKERS ( 2018 06:00 )  x     / x     / 89 u/L / 3.05 ng/mL / x      CARDIAC MARKERS ( 2018 01:40 )  x     / x     / 83 u/L / 2.87 ng/mL / x              BNP  I&O's Detail    2018 07:01  -  2018 07:00  --------------------------------------------------------  IN:    Oral Fluid: 320 mL  Total IN: 320 mL    OUT:    Voided: 1800 mL  Total OUT: 1800 mL    Total NET: -1480 mL      2018 07:01  -  2018 17:57  --------------------------------------------------------  IN:  Total IN: 0 mL    OUT:    Voided: 700 mL  Total OUT: 700 mL    Total NET: -700 mL        Daily     Daily Weight in k.8 (2018 12:10)    RADIOLOGY & ADDITIONAL STUDIES:  PREVIOUS DIAGNOSTIC TESTING:      ECHO  FINDINGS:    STRESS  FINDINGS:    CATHETERIZATION  FINDINGS:      ASSESSMENT:        RECOMMENDATIONS: Patient is a 49yo male with past medical history of hypertension and right arm embolectomy who presented with progressive dyspnea x 3 weeks. He denies chest pain, palpitations, syncope or lightheadedness. However, admits to progressive fatigue x 3 years. Found to be in acute heart failure with proBNP > 92791. Now being diuresed but still unable to lay flat without incessant dry cough.  Echocardiogram showed new severe LV dysfunction with an LVEF of 20%. Cardiac cath today showed nonobstructive disease with RCA 20% stenosis. Patient started on optimal medical therapy.  EKG with sinus tachycardia with PVC's.  Telemetry with normal sinus rhythm with occasional PVC's and 7 beats ?NSVT.  Asked to evaluate patient for a Life Vest.     PAST MEDICAL & SURGICAL HISTORY:  Hypertension  History of embolectomy: in the right arm    MEDICATIONS  (STANDING):  carvedilol 6.25 milliGRAM(s) Oral every 12 hours  furosemide   Injectable 40 milliGRAM(s) IV Push two times a day  heparin  Injectable 5000 Unit(s) SubCutaneous every 8 hours  magnesium oxide 400 milliGRAM(s) Oral three times a day with meals  nicotine - 21 mG/24Hr(s) Patch 1 Patch Transdermal daily  sodium chloride 0.65% Nasal 1 Spray(s) Both Nostrils four times a day    MEDICATIONS  (PRN):  LORazepam   Injectable 2 milliGRAM(s) IV Push every 2 hours PRN CIWA-Ar score increase by 2 points and a total score of 7 or less  zolpidem 5 milliGRAM(s) Oral at bedtime PRN Insomnia      FAMILY HISTORY:  No pertinent family history in first degree relatives      SOCIAL HISTORY:  Occupation is  -> grounded for health reasons.  Lives with wife Michelle who is a nurse.    CIGARETTES: smokes 1 1/2 pkgs daily x 30 years  Drugs: none  ALCOHOL: drinks 4 Vodkas nightly  REVIEW OF SYSTEMS:    CONSTITUTIONAL: No fever, weight loss, chills, shake + fatigue for 3 years.  EYES: No eye pain, visual disturbances, or discharge  ENMT:  No difficulty hearing, tinnitus, vertigo; No sinus or throat pain  NECK: No pain or stiffness  BREASTS: No pain, masses, or nipple discharge  RESPIRATORY: + dry cough, wheezing, hemoptysis, + progressive shortness of breath  CARDIOVASCULAR: No chest pain  palpitations, dizziness, syncope, paroxysmal nocturnal dyspnea, orthopnea, or arm or leg swelling  GASTROINTESTINAL: No abdominal  or epigastric pain, nausea, vomiting, hematemesis, diarrhea, constipation, melena or bright red blood.  GENITOURINARY: No dysuria, nocturia, hematuria, or urinary incontinence  NEUROLOGICAL: No headaches, memory loss, slurred speech, limb weakness, loss of strength, numbness, or tremors  SKIN: No itching, burning, rashes, or lesions   LYMPH NODES: No enlarged glands  ENDOCRINE: No heat or cold intolerance, or hair loss  MUSCULOSKELETAL: No joint pain or swelling, muscle, back, or extremity pain  PSYCHIATRIC: No depression, anxiety, or difficulty sleeping  HEME/LYMPH: No easy bruising or bleeding gums  ALLERGY AND IMMUNOLOGIC: No hives or rash.      Vital Signs Last 24 Hrs  T(C): 36.7 (21 Nov 2018 16:00), Max: 36.7 (21 Nov 2018 09:40)  T(F): 98.1 (21 Nov 2018 16:00), Max: 98.1 (21 Nov 2018 16:00)  HR: 74 (21 Nov 2018 17:05) (73 - 89)  BP: 114/84 (21 Nov 2018 17:05) (114/84 - 144/95)  BP(mean): --  RR: 16 (21 Nov 2018 16:00) (16 - 18)  SpO2: 98% (21 Nov 2018 16:00) (95% - 100%)    PHYSICAL EXAM:    GENERAL: In no apparent distress, well nourished, and hydrated. Unable to lay flat without coughing  HEAD:  Atraumatic, Normocephalic  EYES: EOMI, PERRLA, conjunctiva and sclera clear  ENMT: No tonsillar erythema, exudates, or enlargement; Moist mucous membranes, Good dentition, No lesions  NECK: Supple and normal thyroid.  No JVD or carotid bruit.  Carotid pulse is 2+ bilaterally.  HEART: Regular rate and rhythm; No murmurs, rubs, or gallops.  PULMONARY: Clear to auscultation and perfusion.  No rales, wheezing, or rhonchi bilaterally.  ABDOMEN: Soft, Nontender, Nondistended; Bowel sounds present  EXTREMITIES:  2+ Peripheral Pulses, No clubbing, cyanosis, or edema  LYMPH: No lymphadenopathy noted  NEUROLOGICAL: Grossly nonfocal          INTERPRETATION OF TELEMETRY:    ECG: Sinus tachycardia 112 bpm   LVH  PVC's  QTc 480 ms      LABS:                        17.5   6.99  )-----------( 177      ( 21 Nov 2018 06:30 )             49.9     11-21    141  |  99  |  15  ----------------------------<  105<H>  3.7   |  25  |  1.23    Ca    9.4      21 Nov 2018 06:30  Phos  3.0     11-20  Mg     1.9     11-21    TPro  7.5  /  Alb  4.3  /  TBili  2.0<H>  /  DBili  x   /  AST  43<H>  /  ALT  31  /  AlkPhos  71  11-19    CARDIAC MARKERS ( 20 Nov 2018 06:00 )  x     / x     / 89 u/L / 3.05 ng/mL / x      CARDIAC MARKERS ( 20 Nov 2018 01:40 )  x     / x     / 83 u/L / 2.87 ng/mL / x                      -            RADIOLOGY & ADDITIONAL STUDIES:  PREVIOUS DIAGNOSTIC TESTING:    < from: Xray Chest 1 View- PORTABLE-Urgent (11.19.18 @ 22:29) >  EXAM:  XR CHEST PORTABLE URGENT 1V        PROCEDURE DATE:  Nov 19 2018    INTERPRETATION:  CLINICAL INFORMATION: Congestive heart failure   exacerbation.  TECHNIQUE: AP view of the chest.    COMPARISON: None available.    FINDINGS:   Diffuse perihilar hazy opacification. No pleural effusions or   pneumothoraces.  Cadiomegaly.  The visualized osseous structures are unremarkable for age.  IMPRESSION:  Pulmonary edema. Cardiomegaly    MALACHI FELDMAN M.D., RADIOLOGYRESIDENT  This document has been electronically signed.  SHILOH PARTIDA M.D., ATTENDING RADIOLOGIST  This document has been electronically signed. Nov 20 2018  6:01AM        ECHO  FINDINGS:   Outside:  new severe LV dysfunction LVEF 18-20%    STRESS  FINDINGS:    CATHETERIZATION  FINDINGS:  < from: Cardiac Cath Lab - Adult (11.20.18 @ 14:46) >  HEMODYNAMICS: Hemodynamic assessment demonstrates moderately to severely  elevated LVEDP.  CORONARY VESSELS: The coronary circulation is left dominant.  LM:   --  LM: Normal.  LAD:   --  LAD: Normal.  CX:   --  Circumflex: Normal.  RCA:   --  Proximal RCA: There was a 20 % stenosis.  COMPLICATIONS: There were no complications.  SUMMARY:  HEMODYNAMICS: Hemodynamic assessment demonstrates moderately to severely  elevated LVEDP.  DIAGNOSTIC IMPRESSIONS: Mild nonobstructive coronary artery disease of the  RCA.  Severe nonischemic cardiomyopathy.  New onset systolic heart failure.  DIAGNOSTIC RECOMMENDATIONS: Medical management of congestive heart failure  and left ventricular dysfunction.   the patient on tobacco and ETOH cessation.  Aggressive risk factor modification.  INTERVENTIONAL IMPRESSIONS: Mild nonobstructive coronary artery disease of  the RCA.  < from: Cardiac Cath Lab - Adult (11.20.18 @ 14:46) >  Severe nonischemic cardiomyopathy.  New onset systolic heart failure.  INTERVENTIONAL RECOMMENDATIONS: Medical management of congestive heart  failure and left ventricular dysfunction.   the patient on tobacco and ETOH cessation.  Aggressive risk factor modification.  DISPOSITION: The patient left the catheterization laboratory in stable  condition.

## 2018-11-21 NOTE — DIETITIAN INITIAL EVALUATION ADULT. - OTHER INFO
Nutrition Consult X Registered Dietitian. Pt 47 yo male with Heart Failure. At time of visit Pt appears drowsy/sleepy; Pt's spouse @ bed side answered questions during interview. Pt's appetite not well lately reported. No report of chewing/swallowing difficulty; no report of nausea/vomiting/diarrhea @ present but Pt with constipation, nurse notified. Per spouse Pt eats Regular food at home & does not follow any therapeutic diet. Pt's UBW: ~237#; Pt gained weight "water weight" PTA reported. Pt   Pt's diet order includes DASH/TLC (cholesterol and sodium restricted) with fluid restriction. Prescribed diet discussed with spouse including better food choices, foods to avoid. Written materials on diet provided; RDN answered questions/concerns related to diet. RDN remains available, Pt's spouse made aware.

## 2018-11-21 NOTE — DIETITIAN INITIAL EVALUATION ADULT. - NS AS NUTRI INTERV MEALS SNACK
Diets modified for specific foods and ingredients/Other (specify)/1. Encourage & assist Pt with meals; Monitor PO diet tolerance; Honor food preferences;            2. Monitor labs, daily weights, hydration status;

## 2018-11-22 ENCOUNTER — TRANSCRIPTION ENCOUNTER (OUTPATIENT)
Age: 48
End: 2018-11-22

## 2018-11-22 VITALS
OXYGEN SATURATION: 98 % | RESPIRATION RATE: 18 BRPM | TEMPERATURE: 99 F | HEART RATE: 89 BPM | DIASTOLIC BLOOD PRESSURE: 74 MMHG | SYSTOLIC BLOOD PRESSURE: 101 MMHG

## 2018-11-22 LAB
BUN SERPL-MCNC: 19 MG/DL — SIGNIFICANT CHANGE UP (ref 7–23)
CALCIUM SERPL-MCNC: 10 MG/DL — SIGNIFICANT CHANGE UP (ref 8.4–10.5)
CHLORIDE SERPL-SCNC: 96 MMOL/L — LOW (ref 98–107)
CO2 SERPL-SCNC: 28 MMOL/L — SIGNIFICANT CHANGE UP (ref 22–31)
CREAT SERPL-MCNC: 1.28 MG/DL — SIGNIFICANT CHANGE UP (ref 0.5–1.3)
GLUCOSE SERPL-MCNC: 99 MG/DL — SIGNIFICANT CHANGE UP (ref 70–99)
HCT VFR BLD CALC: 50.7 % — HIGH (ref 39–50)
HGB BLD-MCNC: 17.4 G/DL — HIGH (ref 13–17)
MAGNESIUM SERPL-MCNC: 2.1 MG/DL — SIGNIFICANT CHANGE UP (ref 1.6–2.6)
MCHC RBC-ENTMCNC: 34.3 % — SIGNIFICANT CHANGE UP (ref 32–36)
MCHC RBC-ENTMCNC: 36.5 PG — HIGH (ref 27–34)
MCV RBC AUTO: 106.3 FL — HIGH (ref 80–100)
NRBC # FLD: 0 — SIGNIFICANT CHANGE UP
PLATELET # BLD AUTO: 197 K/UL — SIGNIFICANT CHANGE UP (ref 150–400)
PMV BLD: 10.2 FL — SIGNIFICANT CHANGE UP (ref 7–13)
POTASSIUM SERPL-MCNC: 3.8 MMOL/L — SIGNIFICANT CHANGE UP (ref 3.5–5.3)
POTASSIUM SERPL-SCNC: 3.8 MMOL/L — SIGNIFICANT CHANGE UP (ref 3.5–5.3)
RBC # BLD: 4.77 M/UL — SIGNIFICANT CHANGE UP (ref 4.2–5.8)
RBC # FLD: 15.8 % — HIGH (ref 10.3–14.5)
SODIUM SERPL-SCNC: 141 MMOL/L — SIGNIFICANT CHANGE UP (ref 135–145)
WBC # BLD: 7.89 K/UL — SIGNIFICANT CHANGE UP (ref 3.8–10.5)
WBC # FLD AUTO: 7.89 K/UL — SIGNIFICANT CHANGE UP (ref 3.8–10.5)

## 2018-11-22 PROCEDURE — 99233 SBSQ HOSP IP/OBS HIGH 50: CPT

## 2018-11-22 RX ORDER — CHLORTHALIDONE 50 MG
1 TABLET ORAL
Qty: 0 | Refills: 0 | COMMUNITY

## 2018-11-22 RX ORDER — NICOTINE POLACRILEX 2 MG
1 GUM BUCCAL
Qty: 30 | Refills: 0 | OUTPATIENT
Start: 2018-11-22 | End: 2018-12-21

## 2018-11-22 RX ORDER — FLUTICASONE PROPIONATE 50 MCG
1 SPRAY, SUSPENSION NASAL
Qty: 1 | Refills: 0 | OUTPATIENT
Start: 2018-11-22 | End: 2018-12-21

## 2018-11-22 RX ORDER — FUROSEMIDE 40 MG
1 TABLET ORAL
Qty: 30 | Refills: 0 | OUTPATIENT
Start: 2018-11-22 | End: 2018-12-21

## 2018-11-22 RX ORDER — LISINOPRIL 2.5 MG/1
1 TABLET ORAL
Qty: 30 | Refills: 0 | OUTPATIENT
Start: 2018-11-22 | End: 2018-12-21

## 2018-11-22 RX ORDER — POTASSIUM CHLORIDE 20 MEQ
1 PACKET (EA) ORAL
Qty: 30 | Refills: 0 | OUTPATIENT
Start: 2018-11-22 | End: 2018-12-21

## 2018-11-22 RX ORDER — CARVEDILOL PHOSPHATE 80 MG/1
1 CAPSULE, EXTENDED RELEASE ORAL
Qty: 60 | Refills: 0 | OUTPATIENT
Start: 2018-11-22 | End: 2018-12-21

## 2018-11-22 RX ADMIN — Medication 1 PATCH: at 18:39

## 2018-11-22 RX ADMIN — CARVEDILOL PHOSPHATE 6.25 MILLIGRAM(S): 80 CAPSULE, EXTENDED RELEASE ORAL at 05:21

## 2018-11-22 RX ADMIN — Medication 1 PATCH: at 12:43

## 2018-11-22 RX ADMIN — Medication 1 PATCH: at 12:21

## 2018-11-22 RX ADMIN — Medication 1 PATCH: at 10:48

## 2018-11-22 RX ADMIN — MAGNESIUM OXIDE 400 MG ORAL TABLET 400 MILLIGRAM(S): 241.3 TABLET ORAL at 18:39

## 2018-11-22 RX ADMIN — Medication 40 MILLIGRAM(S): at 05:21

## 2018-11-22 RX ADMIN — CARVEDILOL PHOSPHATE 6.25 MILLIGRAM(S): 80 CAPSULE, EXTENDED RELEASE ORAL at 18:39

## 2018-11-22 RX ADMIN — MAGNESIUM OXIDE 400 MG ORAL TABLET 400 MILLIGRAM(S): 241.3 TABLET ORAL at 12:44

## 2018-11-22 RX ADMIN — LISINOPRIL 10 MILLIGRAM(S): 2.5 TABLET ORAL at 05:21

## 2018-11-22 RX ADMIN — HEPARIN SODIUM 5000 UNIT(S): 5000 INJECTION INTRAVENOUS; SUBCUTANEOUS at 05:21

## 2018-11-22 NOTE — DISCHARGE NOTE ADULT - CARE PLAN
Principal Discharge DX:	CHF (congestive heart failure)  Goal:	Followup with PMD and take all medications prescribed.  Assessment and plan of treatment:	Followup with PMD and take all medications prescribed. Low salt, low fat, low cholesterol diet  Secondary Diagnosis:	Essential hypertension  Goal:	Followup with PMD and take all medications prescribed.  Assessment and plan of treatment:	Followup with PMD and take all medications prescribed. Low salt, low fat, low cholesterol diet

## 2018-11-22 NOTE — DISCHARGE NOTE ADULT - MEDICATION SUMMARY - MEDICATIONS TO TAKE
I will START or STAY ON the medications listed below when I get home from the hospital:    Zestril 2.5 mg oral tablet  -- 1 tab(s) by mouth once a day  -- Indication: For CHF (congestive heart failure)    Ambien 5 mg oral tablet  -- 1 tab(s) by mouth once a day (at bedtime)  -- Indication: For sleep aid    carvedilol 6.25 mg oral tablet  -- 1 tab(s) by mouth every 12 hours  -- Indication: For HTN    Lasix 40 mg oral tablet  -- 1 tab(s) by mouth once a day  -- Indication: For CHF (congestive heart failure)    K-Tab 10 mEq oral tablet, extended release  -- 1 tab(s) by mouth once a day   -- It is very important that you take or use this exactly as directed.  Do not skip doses or discontinue unless directed by your doctor.  Medication should be taken with plenty of water.  Take with food or milk.    -- Indication: For potassium supplement    fluticasone 50 mcg/inh nasal spray  -- 1 spray(s) into nose 2 times a day  -- Indication: For Nasal spray    nicotine 21 mg/24 hr transdermal film, extended release  -- 1 patch by transdermal patch once a day   -- Indication: For smoke cessation

## 2018-11-22 NOTE — DISCHARGE NOTE ADULT - PATIENT PORTAL LINK FT
You can access the [facility] Patient Portal, offered by [facility], by registering with the following website: [email or web address]

## 2018-11-22 NOTE — PROGRESS NOTE ADULT - ASSESSMENT
This is a 49 yo male with uncontrolled hypertension admitted with acute systolic heart failure and new nonischemic cardiomyopathy with   EF 18-20%.  Started on optimal medical therapy.  EKG and telemetry with normal sinus rhythm with occasional PVC's.  Patient clinically improved with IV Lasix although still unable to lay flat without coughing. EKG and telemetry with occasional PVC's and 7 beats of NSVT vs SVT. No syncope or palpitations by history.  We have spoken to the patient about wearing a Life Vest during the next three months for prevention of sudden cardiac death.  After three months of OMT he would be reevaluated with another echocardiogram to determine LVEF.  If at that time > 35%, would not recommend an ICD.  If less that 35%, would consider ICD for primary prevention of sudden cardiac death.  I have discussed the risks, benefits and alternatives to a Life Vest with the patient and he remains undecided. I have spoken with his wife Michelle who is also undecided.  In the meantime, April Stevens, the Life Vest Representative has been contacted and will verify insurance authorization. Still awaiting approval from insurance and for fitting my company.    Paul Mendes  Cardiology Fellow

## 2018-11-22 NOTE — PROGRESS NOTE ADULT - SUBJECTIVE AND OBJECTIVE BOX
Interval Events:    Still awaiting to hear about insurance approval and fitting for Life Vest.    ROS: Denies HA/dizziness/CP/SOB/palpitations/LE edema/abd pain    MEDICATIONS:  carvedilol 6.25 milliGRAM(s) Oral every 12 hours  furosemide   Injectable 40 milliGRAM(s) IV Push two times a day  heparin  Injectable 5000 Unit(s) SubCutaneous every 8 hours  lisinopril 10 milliGRAM(s) Oral daily        LORazepam   Injectable 2 milliGRAM(s) IV Push every 2 hours PRN  zolpidem 5 milliGRAM(s) Oral at bedtime PRN        fluticasone propionate 50 MICROgram(s)/spray Nasal Spray 1 Spray(s) Both Nostrils two times a day  magnesium oxide 400 milliGRAM(s) Oral three times a day with meals  sodium chloride 0.65% Nasal 1 Spray(s) Both Nostrils four times a day      PHYSICAL EXAM:  T(C): 36.4 (11-22-18 @ 05:18), Max: 36.7 (11-21-18 @ 11:54)  HR: 80 (11-22-18 @ 05:18) (71 - 89)  BP: 118/80 (11-22-18 @ 05:18) (108/69 - 130/87)  RR: 17 (11-22-18 @ 05:18) (16 - 18)  SpO2: 97% (11-22-18 @ 05:18) (95% - 98%)  Wt(kg): --  I&O's Summary    21 Nov 2018 07:01  -  22 Nov 2018 07:00  --------------------------------------------------------  IN: 360 mL / OUT: 1200 mL / NET: -840 mL        Appearance: No Acute Distress  Cardiovascular: Normal S1 S2, RRR  Respiratory: Clear to auscultation  Gastrointestinal: Soft, Non-tender	  Neurologic: Non-focal  Extremities: No edema  Psychiatry: A & O x 3, Mood & affect appropriate    LABS:	 	    CBC Full  -  ( 22 Nov 2018 04:00 )  WBC Count : 7.89 K/uL  Hemoglobin : 17.4 g/dL  Hematocrit : 50.7 %  Platelet Count - Automated : 197 K/uL  Mean Cell Volume : 106.3 fL  Mean Cell Hemoglobin : 36.5 pg  Mean Cell Hemoglobin Concentration : 34.3 %  Auto Neutrophil # : x  Auto Lymphocyte # : x  Auto Monocyte # : x  Auto Eosinophil # : x  Auto Basophil # : x  Auto Neutrophil % : x  Auto Lymphocyte % : x  Auto Monocyte % : x  Auto Eosinophil % : x  Auto Basophil % : x    11-22    141  |  96<L>  |  19  ----------------------------<  99  3.8   |  28  |  1.28  11-21    141  |  99  |  15  ----------------------------<  105<H>  3.7   |  25  |  1.23    Ca    10.0      22 Nov 2018 04:00  Ca    9.4      21 Nov 2018 06:30  Mg     2.1     11-22  Mg     1.9     11-21        TELEMETRY: IZABEL

## 2018-11-22 NOTE — DISCHARGE NOTE ADULT - CARE PROVIDERS DIRECT ADDRESSES
[email or web address]

## 2018-11-22 NOTE — DISCHARGE NOTE ADULT - HOSPITAL COURSE
49 y/o male + ETOH and + smoker with h/o HTN presents with shortness of breath    Acute systolic heart failure, NonICMP   s/p Cath revealing mild nonobstructive CAD of RCA, sev nonicmp, LVEDP 35,  continue with coreg 6.25 mg BID and lisinopril   cont lasix, dec ot 40 po daily   dec lisinopril to 2.5 mg jessenia;y  life vest fitted/placed  2. HTN   bp controlled   3. Smoking cessation/ ETOH cessation offered   no signs of etoh withdrawal  d/c home today with life vest  add kcl 10  outpt f/u dr esquivel

## 2018-11-22 NOTE — DISCHARGE NOTE ADULT - CARE PROVIDER_API CALL
Manny Weathers (MD), Cardiac Electrophysiology; Cardiovascular Disease; Internal Medicine  [id number] [place] [address]  [address] [place] [address]  Phone: [phone]  Fax: [phone]    Joel Tompkins), Cardiovascular Disease; Internal Medicine  [address], [place] [id number]  Phone: [phone]  Fax: [phone]

## 2018-11-22 NOTE — PROGRESS NOTE ADULT - SUBJECTIVE AND OBJECTIVE BOX
CARDIOLOGY FOLLOW UP NOTE - DR. ORTEZ    Subjective:    no chest pain,m sob      PHYSICAL EXAM:  T(C): 36.4 (11-22-18 @ 11:30), Max: 36.6 (11-21-18 @ 23:40)  HR: 77 (11-22-18 @ 12:30) (71 - 80)  BP: 97/61 (11-22-18 @ 12:30) (93/54 - 118/80)  RR: 18 (11-22-18 @ 12:30) (16 - 18)  SpO2: 96% (11-22-18 @ 12:30) (96% - 98%)  Wt(kg): --  I&O's Summary    21 Nov 2018 07:01  -  22 Nov 2018 07:00  --------------------------------------------------------  IN: 360 mL / OUT: 1200 mL / NET: -840 mL    22 Nov 2018 07:01  -  22 Nov 2018 16:11  --------------------------------------------------------  IN: 240 mL / OUT: 0 mL / NET: 240 mL        Appearance: Normal	  Cardiovascular: Normal S1 S2,RRR, No JVD, No murmurs  Respiratory: Lungs clear to auscultation	  Gastrointestinal:  Soft, Non-tender, + BS	  Extremities: Normal range of motion, No clubbing, cyanosis or edema  Vascular: Peripheral pulses palpable 2+ bilaterally    MEDICATIONS  (STANDING):  carvedilol 6.25 milliGRAM(s) Oral every 12 hours  fluticasone propionate 50 MICROgram(s)/spray Nasal Spray 1 Spray(s) Both Nostrils two times a day  furosemide   Injectable 40 milliGRAM(s) IV Push two times a day  heparin  Injectable 5000 Unit(s) SubCutaneous every 8 hours  lisinopril 10 milliGRAM(s) Oral daily  magnesium oxide 400 milliGRAM(s) Oral three times a day with meals  nicotine - 21 mG/24Hr(s) Patch 1 Patch Transdermal daily  sodium chloride 0.65% Nasal 1 Spray(s) Both Nostrils four times a day      TELEMETRY: 	    ECG:  	  RADIOLOGY:   DIAGNOSTIC TESTING:  [ ] Echocardiogram:  [ ] Catheterization:  [ ] Stress Test:    OTHER: 	    LABS:	 	    CARDIAC MARKERS:                                17.4   7.89  )-----------( 197      ( 22 Nov 2018 04:00 )             50.7     11-22    141  |  96<L>  |  19  ----------------------------<  99  3.8   |  28  |  1.28    Ca    10.0      22 Nov 2018 04:00  Mg     2.1     11-22      proBNP:     Lipid Profile:   HgA1c:

## 2018-11-22 NOTE — PROGRESS NOTE ADULT - ASSESSMENT
Echo 11/19/2018: mild- mod mr, mild- mod TR. Severe global LV sys dsyfx. EF 20%  Stress test 11/19/2018: patchy decrease uptake, can not rule out ischemia, Severe global LV dysfx EF 18%    a/p  47 y/o male + ETOH and + smoker with h/o HTN presents with shortness of breath     1. Acute systolic heart failure, NonICMP   s/p Cath revealing mild nonobstructive CAD of RCA, sev nonicmp, LVEDP 35,  continue with coreg 6.25 mg BID and lisinopril   cont lasix, dec ot 40 po daily   dec lisinopril to 2.5 mg jessenia;y  life vest fitted/placed      2. HTN   bp controlled       3. Smoking cessation/ ETOH cessation offered   no signs of etoh withdrawal        d/c home today with life vest  add kcl 10  outpt f/u dr esquivel

## 2018-11-26 PROBLEM — Z00.00 ENCOUNTER FOR PREVENTIVE HEALTH EXAMINATION: Status: ACTIVE | Noted: 2018-11-26

## 2018-11-26 PROBLEM — I10 ESSENTIAL (PRIMARY) HYPERTENSION: Chronic | Status: ACTIVE | Noted: 2018-11-19

## 2019-01-01 NOTE — PROGRESS NOTE ADULT - ATTENDING COMMENTS
agree with NP note above  pt diuresing well  s/p cath with no significant CAD  CIWA  Increase coreg  switch to oral diuretics tomorrow
Statement Selected

## 2019-01-04 ENCOUNTER — APPOINTMENT (OUTPATIENT)
Dept: ELECTROPHYSIOLOGY | Facility: CLINIC | Age: 49
End: 2019-01-04
Payer: COMMERCIAL

## 2019-01-04 ENCOUNTER — NON-APPOINTMENT (OUTPATIENT)
Age: 49
End: 2019-01-04

## 2019-01-04 VITALS
DIASTOLIC BLOOD PRESSURE: 83 MMHG | HEIGHT: 72 IN | OXYGEN SATURATION: 97 % | HEART RATE: 62 BPM | BODY MASS INDEX: 33.59 KG/M2 | WEIGHT: 248 LBS | SYSTOLIC BLOOD PRESSURE: 119 MMHG

## 2019-01-04 DIAGNOSIS — I10 ESSENTIAL (PRIMARY) HYPERTENSION: ICD-10-CM

## 2019-01-04 DIAGNOSIS — I25.10 ATHEROSCLEROTIC HEART DISEASE OF NATIVE CORONARY ARTERY W/OUT ANGINA PECTORIS: ICD-10-CM

## 2019-01-04 DIAGNOSIS — Z78.9 OTHER SPECIFIED HEALTH STATUS: ICD-10-CM

## 2019-01-04 DIAGNOSIS — Z86.718 PERSONAL HISTORY OF OTHER VENOUS THROMBOSIS AND EMBOLISM: ICD-10-CM

## 2019-01-04 DIAGNOSIS — Z87.898 PERSONAL HISTORY OF OTHER SPECIFIED CONDITIONS: ICD-10-CM

## 2019-01-04 DIAGNOSIS — Z72.0 TOBACCO USE: ICD-10-CM

## 2019-01-04 PROCEDURE — 99214 OFFICE O/P EST MOD 30 MIN: CPT

## 2019-01-04 PROCEDURE — 93000 ELECTROCARDIOGRAM COMPLETE: CPT

## 2019-01-04 RX ORDER — CARVEDILOL 6.25 MG/1
6.25 TABLET, FILM COATED ORAL
Refills: 1 | Status: ACTIVE | COMMUNITY
Start: 2019-01-04

## 2019-01-04 RX ORDER — FUROSEMIDE 40 MG/1
40 TABLET ORAL DAILY
Refills: 0 | Status: ACTIVE | COMMUNITY
Start: 2019-01-04

## 2019-01-04 RX ORDER — ZOLPIDEM TARTRATE 5 MG/1
5 TABLET, FILM COATED ORAL
Refills: 0 | Status: ACTIVE | COMMUNITY
Start: 2019-01-04

## 2019-01-04 NOTE — PHYSICAL EXAM
[General Appearance - Well Developed] : well developed [Normal Appearance] : normal appearance [Well Groomed] : well groomed [General Appearance - Well Nourished] : well nourished [No Deformities] : no deformities [General Appearance - In No Acute Distress] : no acute distress [Normal Conjunctiva] : the conjunctiva exhibited no abnormalities [Eyelids - No Xanthelasma] : the eyelids demonstrated no xanthelasmas [Normal Oral Mucosa] : normal oral mucosa [No Oral Pallor] : no oral pallor [No Oral Cyanosis] : no oral cyanosis [Normal Jugular Venous A Waves Present] : normal jugular venous A waves present [Normal Jugular Venous V Waves Present] : normal jugular venous V waves present [No Jugular Venous Garay A Waves] : no jugular venous garay A waves [Heart Rate And Rhythm] : heart rate and rhythm were normal [Heart Sounds] : normal S1 and S2 [Murmurs] : no murmurs present [Respiration, Rhythm And Depth] : normal respiratory rhythm and effort [Exaggerated Use Of Accessory Muscles For Inspiration] : no accessory muscle use [Auscultation Breath Sounds / Voice Sounds] : lungs were clear to auscultation bilaterally [Abdomen Soft] : soft [Abdomen Tenderness] : non-tender [Abdomen Mass (___ Cm)] : no abdominal mass palpated [Abnormal Walk] : normal gait [Gait - Sufficient For Exercise Testing] : the gait was sufficient for exercise testing [Nail Clubbing] : no clubbing of the fingernails [Cyanosis, Localized] : no localized cyanosis [Petechial Hemorrhages (___cm)] : no petechial hemorrhages [Skin Color & Pigmentation] : normal skin color and pigmentation [] : no rash [No Venous Stasis] : no venous stasis [Skin Lesions] : no skin lesions [No Skin Ulcers] : no skin ulcer [No Xanthoma] : no  xanthoma was observed [Oriented To Time, Place, And Person] : oriented to person, place, and time [Affect] : the affect was normal [Mood] : the mood was normal [No Anxiety] : not feeling anxious

## 2019-01-04 NOTE — REASON FOR VISIT
[Follow-Up - From Hospitalization] : follow-up of a recent hospitalization for [Heart Failure] : congestive heart failure [Discharge Date: ___] : Discharge Date: [unfilled]

## 2019-01-05 NOTE — HISTORY OF PRESENT ILLNESS
[FreeTextEntry1] : Joel Tompkins MD\par \par I saw Eliecer Kebede on January 4, 2019. As you know, he is a 49y/o man with Hx of DVT (4 years ago), uncontrolled hypertension and recent hospitalization (11/2018) for acute systolic heart failure and new nonischemic cardiomyopathy with EF 18-20%. Post hospitalization, he was started on optimal medical therapy and sent home with LifeVest. Admits doing well with improved dyspnea and no cardiac complaints. Has his LifeVest with him but not currently wearing at present but typically wears all day. Denies chest pain, palpitations, SOB, syncope or near syncope. \par

## 2019-01-05 NOTE — DISCUSSION/SUMMARY
[FreeTextEntry1] : In summary, Eliecer Kebede is a 49y/o man with Hx of DVT (4 years ago), uncontrolled hypertension and recent hospitalization (11/2018) for acute systolic heart failure and new nonischemic cardiomyopathy with EF 18-20%. Post hospitalization, he was started on optimal medical therapy and sent home with LifeVest. Admits doing well with improved dyspnea and no cardiac complaints. Has his LifeVest with him but not currently wearing at present but typically wears all day. Denies chest pain, palpitations, SOB, syncope or near syncope. EKG today NSR. Recommend repeat echocardiogram in 2 months to reassess LVEF. If LVEF remains < 35%, recommend placement of ICD for primary prevention of SCD. \par \par Sincerely,\par \par Manny Weathers MD

## 2019-02-18 NOTE — DIETITIAN INITIAL EVALUATION ADULT. - CURRENT DIET ORDER MEETS ESTIMATED NUTRIENT REQUIREMENTS
Pt has continuously paced in and out of the pt's room and coming up to the nurses station window asking for many different things such as pillow, juice, blanket, his clothing and to call for a ride. We have told him several times that it is not an appropriate time to call a parent for a ride considering that we do not have an estimate of a discharge time. He has asked if he gets a ride from a friend if he would be able to leave earlier. RN explained for at least the third time that we do not know when he is going to be able to leave, that the doctor still has to see him and that he will not leave until he has sobered up more which will take several more times. He has become significantly more agitated, twitchy, sweaty, shaky and paranoid. He now appears angry that we aren't doing anything for him at this time. He does not seem to understand that he has to wait and is incredibly anxious to leave the ER. At this time he is constantly standing at the nurses station window staring at us and pacing around.    Statement Selected

## 2019-02-26 ENCOUNTER — APPOINTMENT (OUTPATIENT)
Dept: ELECTROPHYSIOLOGY | Facility: CLINIC | Age: 49
End: 2019-02-26
Payer: COMMERCIAL

## 2019-02-26 VITALS
HEART RATE: 69 BPM | WEIGHT: 249 LBS | RESPIRATION RATE: 16 BRPM | BODY MASS INDEX: 33.72 KG/M2 | SYSTOLIC BLOOD PRESSURE: 130 MMHG | TEMPERATURE: 97.7 F | DIASTOLIC BLOOD PRESSURE: 87 MMHG | HEIGHT: 72 IN | OXYGEN SATURATION: 98 %

## 2019-02-26 DIAGNOSIS — I42.8 OTHER CARDIOMYOPATHIES: ICD-10-CM

## 2019-02-26 DIAGNOSIS — I51.9 HEART DISEASE, UNSPECIFIED: ICD-10-CM

## 2019-02-26 PROCEDURE — 99213 OFFICE O/P EST LOW 20 MIN: CPT

## 2019-02-26 PROCEDURE — 93000 ELECTROCARDIOGRAM COMPLETE: CPT

## 2019-02-26 RX ORDER — POTASSIUM CHLORIDE 750 MG/1
10 CAPSULE, EXTENDED RELEASE ORAL DAILY
Refills: 0 | Status: DISCONTINUED | COMMUNITY
Start: 2019-01-04 | End: 2019-02-26

## 2019-02-26 RX ORDER — SACUBITRIL AND VALSARTAN 97; 103 MG/1; MG/1
97-103 TABLET, FILM COATED ORAL TWICE DAILY
Refills: 0 | Status: ACTIVE | COMMUNITY
Start: 2019-02-26

## 2019-02-26 RX ORDER — LISINOPRIL 2.5 MG/1
2.5 TABLET ORAL DAILY
Refills: 0 | Status: DISCONTINUED | COMMUNITY
Start: 2019-01-04 | End: 2019-02-26

## 2019-02-26 NOTE — DISCUSSION/SUMMARY
[FreeTextEntry1] : In summary, Eliecer Kebede is a 47y/o man with Hx of DVT (4 years ago), uncontrolled hypertension and recent hospitalization (11/2018) for acute systolic heart failure and new nonischemic cardiomyopathy with EF 18-20% who presents today for routine f/u. Post hospitalization, he was started on optimal medical therapy and sent home with LifeVest. Admits doing well with improved dyspnea and no cardiac complaints. Denies chest pain, palpitations, SOB, syncope or near syncope. Had repeat echo on 2/6/2019 which revealed normal LV function, EF 53%. No indication for ICD placement at this time. EKG NSR. Resume OMT as prescribed and regular f/u with Cardiologist. May RTO as needed or if any new symptoms, findings, or changes occur. Patient does not need Life Vest given improvement of EF. \par \par Sincerely,\par \par Manny Weathers MD

## 2019-02-26 NOTE — HISTORY OF PRESENT ILLNESS
[FreeTextEntry1] : Joel Tompkins MD\par \par I saw Eliecer Kebede on February 26, 2019. As you know, he is a 47y/o man with Hx of DVT (4 years ago), uncontrolled hypertension and recent hospitalization (11/2018) for acute systolic heart failure and new nonischemic cardiomyopathy with EF 18-20% who presents today for routine f/u. Post hospitalization, he was started on optimal medical therapy and sent home with LifeVest. Admits doing well with improved dyspnea and no cardiac complaints. Denies chest pain, palpitations, SOB, syncope or near syncope. Had repeat echo on 2/6/2019 which revealed normal LV function, EF 53%. \par

## 2019-02-26 NOTE — PHYSICAL EXAM
[General Appearance - Well Developed] : well developed [Normal Appearance] : normal appearance [Well Groomed] : well groomed [General Appearance - Well Nourished] : well nourished [No Deformities] : no deformities [General Appearance - In No Acute Distress] : no acute distress [Normal Conjunctiva] : the conjunctiva exhibited no abnormalities [Eyelids - No Xanthelasma] : the eyelids demonstrated no xanthelasmas [Normal Oral Mucosa] : normal oral mucosa [No Oral Pallor] : no oral pallor [No Oral Cyanosis] : no oral cyanosis [Normal Jugular Venous A Waves Present] : normal jugular venous A waves present [Normal Jugular Venous V Waves Present] : normal jugular venous V waves present [No Jugular Venous Garay A Waves] : no jugular venous garay A waves [Respiration, Rhythm And Depth] : normal respiratory rhythm and effort [Exaggerated Use Of Accessory Muscles For Inspiration] : no accessory muscle use [Auscultation Breath Sounds / Voice Sounds] : lungs were clear to auscultation bilaterally [Heart Rate And Rhythm] : heart rate and rhythm were normal [Heart Sounds] : normal S1 and S2 [Murmurs] : no murmurs present [Abdomen Soft] : soft [Abdomen Tenderness] : non-tender [Abdomen Mass (___ Cm)] : no abdominal mass palpated [Abnormal Walk] : normal gait [Gait - Sufficient For Exercise Testing] : the gait was sufficient for exercise testing [Nail Clubbing] : no clubbing of the fingernails [Cyanosis, Localized] : no localized cyanosis [Petechial Hemorrhages (___cm)] : no petechial hemorrhages [Skin Color & Pigmentation] : normal skin color and pigmentation [] : no rash [No Venous Stasis] : no venous stasis [Skin Lesions] : no skin lesions [No Skin Ulcers] : no skin ulcer [No Xanthoma] : no  xanthoma was observed [Oriented To Time, Place, And Person] : oriented to person, place, and time [Affect] : the affect was normal [Mood] : the mood was normal [No Anxiety] : not feeling anxious

## 2022-07-05 ENCOUNTER — EMERGENCY (EMERGENCY)
Facility: HOSPITAL | Age: 52
LOS: 1 days | Discharge: HOME | End: 2022-07-05
Attending: EMERGENCY MEDICINE
Payer: COMMERCIAL

## 2022-07-05 VITALS
TEMPERATURE: 99 F | SYSTOLIC BLOOD PRESSURE: 148 MMHG | DIASTOLIC BLOOD PRESSURE: 89 MMHG | RESPIRATION RATE: 20 BRPM | HEART RATE: 84 BPM | OXYGEN SATURATION: 97 %

## 2022-07-05 VITALS
WEIGHT: 214.95 LBS | SYSTOLIC BLOOD PRESSURE: 155 MMHG | DIASTOLIC BLOOD PRESSURE: 110 MMHG | RESPIRATION RATE: 22 BRPM | HEART RATE: 85 BPM | HEIGHT: 72 IN | TEMPERATURE: 98 F | OXYGEN SATURATION: 96 %

## 2022-07-05 DIAGNOSIS — Z98.890 OTHER SPECIFIED POSTPROCEDURAL STATES: Chronic | ICD-10-CM

## 2022-07-05 LAB
ALBUMIN SERPL ELPH-MCNC: 4.8 G/DL — SIGNIFICANT CHANGE UP (ref 3.3–5)
ALP SERPL-CCNC: 51 U/L — SIGNIFICANT CHANGE UP (ref 40–120)
ALT FLD-CCNC: 12 U/L — SIGNIFICANT CHANGE UP (ref 10–45)
ANION GAP SERPL CALC-SCNC: 13 MMOL/L — SIGNIFICANT CHANGE UP (ref 5–17)
AST SERPL-CCNC: 16 U/L — SIGNIFICANT CHANGE UP (ref 10–40)
BASE EXCESS BLDV CALC-SCNC: 2.1 MMOL/L — HIGH (ref -2–2)
BASOPHILS # BLD AUTO: 0.02 K/UL — SIGNIFICANT CHANGE UP (ref 0–0.2)
BASOPHILS NFR BLD AUTO: 0.3 % — SIGNIFICANT CHANGE UP (ref 0–2)
BILIRUB SERPL-MCNC: 0.6 MG/DL — SIGNIFICANT CHANGE UP (ref 0.2–1.2)
BUN SERPL-MCNC: 11 MG/DL — SIGNIFICANT CHANGE UP (ref 7–23)
CA-I SERPL-SCNC: 1.27 MMOL/L — SIGNIFICANT CHANGE UP (ref 1.15–1.33)
CALCIUM SERPL-MCNC: 9.8 MG/DL — SIGNIFICANT CHANGE UP (ref 8.4–10.5)
CHLORIDE BLDV-SCNC: 100 MMOL/L — SIGNIFICANT CHANGE UP (ref 96–108)
CHLORIDE SERPL-SCNC: 101 MMOL/L — SIGNIFICANT CHANGE UP (ref 96–108)
CO2 BLDV-SCNC: 31 MMOL/L — HIGH (ref 22–26)
CO2 SERPL-SCNC: 25 MMOL/L — SIGNIFICANT CHANGE UP (ref 22–31)
CREAT SERPL-MCNC: 1.43 MG/DL — HIGH (ref 0.5–1.3)
EGFR: 58 ML/MIN/1.73M2 — LOW
EOSINOPHIL # BLD AUTO: 0.02 K/UL — SIGNIFICANT CHANGE UP (ref 0–0.5)
EOSINOPHIL NFR BLD AUTO: 0.3 % — SIGNIFICANT CHANGE UP (ref 0–6)
GAS PNL BLDV: 132 MMOL/L — LOW (ref 136–145)
GAS PNL BLDV: SIGNIFICANT CHANGE UP
GAS PNL BLDV: SIGNIFICANT CHANGE UP
GLUCOSE BLDV-MCNC: 114 MG/DL — HIGH (ref 70–99)
GLUCOSE SERPL-MCNC: 115 MG/DL — HIGH (ref 70–99)
HCO3 BLDV-SCNC: 29 MMOL/L — SIGNIFICANT CHANGE UP (ref 22–29)
HCT VFR BLD CALC: 51.6 % — HIGH (ref 39–50)
HCT VFR BLDA CALC: 55 % — HIGH (ref 39–51)
HGB BLD CALC-MCNC: 18.3 G/DL — HIGH (ref 12.6–17.4)
HGB BLD-MCNC: 17.6 G/DL — HIGH (ref 13–17)
IMM GRANULOCYTES NFR BLD AUTO: 0.6 % — SIGNIFICANT CHANGE UP (ref 0–1.5)
LACTATE BLDV-MCNC: 1.5 MMOL/L — SIGNIFICANT CHANGE UP (ref 0.7–2)
LYMPHOCYTES # BLD AUTO: 0.79 K/UL — LOW (ref 1–3.3)
LYMPHOCYTES # BLD AUTO: 11.8 % — LOW (ref 13–44)
MCHC RBC-ENTMCNC: 34.1 GM/DL — SIGNIFICANT CHANGE UP (ref 32–36)
MCHC RBC-ENTMCNC: 35.8 PG — HIGH (ref 27–34)
MCV RBC AUTO: 104.9 FL — HIGH (ref 80–100)
MONOCYTES # BLD AUTO: 0.9 K/UL — SIGNIFICANT CHANGE UP (ref 0–0.9)
MONOCYTES NFR BLD AUTO: 13.5 % — SIGNIFICANT CHANGE UP (ref 2–14)
NEUTROPHILS # BLD AUTO: 4.9 K/UL — SIGNIFICANT CHANGE UP (ref 1.8–7.4)
NEUTROPHILS NFR BLD AUTO: 73.5 % — SIGNIFICANT CHANGE UP (ref 43–77)
NRBC # BLD: 0 /100 WBCS — SIGNIFICANT CHANGE UP (ref 0–0)
PCO2 BLDV: 53 MMHG — SIGNIFICANT CHANGE UP (ref 42–55)
PH BLDV: 7.35 — SIGNIFICANT CHANGE UP (ref 7.32–7.43)
PLATELET # BLD AUTO: 148 K/UL — LOW (ref 150–400)
PO2 BLDV: 22 MMHG — LOW (ref 25–45)
POTASSIUM BLDV-SCNC: 4.3 MMOL/L — SIGNIFICANT CHANGE UP (ref 3.5–5.1)
POTASSIUM SERPL-MCNC: 4.4 MMOL/L — SIGNIFICANT CHANGE UP (ref 3.5–5.3)
POTASSIUM SERPL-SCNC: 4.4 MMOL/L — SIGNIFICANT CHANGE UP (ref 3.5–5.3)
PROT SERPL-MCNC: 7.9 G/DL — SIGNIFICANT CHANGE UP (ref 6–8.3)
RBC # BLD: 4.92 M/UL — SIGNIFICANT CHANGE UP (ref 4.2–5.8)
RBC # FLD: 12.8 % — SIGNIFICANT CHANGE UP (ref 10.3–14.5)
SAO2 % BLDV: 29.9 % — LOW (ref 67–88)
SODIUM SERPL-SCNC: 139 MMOL/L — SIGNIFICANT CHANGE UP (ref 135–145)
WBC # BLD: 6.67 K/UL — SIGNIFICANT CHANGE UP (ref 3.8–10.5)
WBC # FLD AUTO: 6.67 K/UL — SIGNIFICANT CHANGE UP (ref 3.8–10.5)

## 2022-07-05 PROCEDURE — 82803 BLOOD GASES ANY COMBINATION: CPT

## 2022-07-05 PROCEDURE — 85018 HEMOGLOBIN: CPT

## 2022-07-05 PROCEDURE — 85025 COMPLETE CBC W/AUTO DIFF WBC: CPT

## 2022-07-05 PROCEDURE — 82947 ASSAY GLUCOSE BLOOD QUANT: CPT

## 2022-07-05 PROCEDURE — 84132 ASSAY OF SERUM POTASSIUM: CPT

## 2022-07-05 PROCEDURE — 41800 DRAINAGE OF GUM LESION: CPT

## 2022-07-05 PROCEDURE — 84295 ASSAY OF SERUM SODIUM: CPT

## 2022-07-05 PROCEDURE — 36415 COLL VENOUS BLD VENIPUNCTURE: CPT

## 2022-07-05 PROCEDURE — 96375 TX/PRO/DX INJ NEW DRUG ADDON: CPT | Mod: XU

## 2022-07-05 PROCEDURE — 99284 EMERGENCY DEPT VISIT MOD MDM: CPT | Mod: 25

## 2022-07-05 PROCEDURE — 82330 ASSAY OF CALCIUM: CPT

## 2022-07-05 PROCEDURE — 82435 ASSAY OF BLOOD CHLORIDE: CPT

## 2022-07-05 PROCEDURE — 83605 ASSAY OF LACTIC ACID: CPT

## 2022-07-05 PROCEDURE — 96374 THER/PROPH/DIAG INJ IV PUSH: CPT | Mod: XU

## 2022-07-05 PROCEDURE — 80053 COMPREHEN METABOLIC PANEL: CPT

## 2022-07-05 PROCEDURE — 85014 HEMATOCRIT: CPT

## 2022-07-05 PROCEDURE — 99284 EMERGENCY DEPT VISIT MOD MDM: CPT

## 2022-07-05 RX ORDER — ACETAMINOPHEN 500 MG
1000 TABLET ORAL ONCE
Refills: 0 | Status: COMPLETED | OUTPATIENT
Start: 2022-07-05 | End: 2022-07-05

## 2022-07-05 RX ORDER — SODIUM CHLORIDE 9 MG/ML
1000 INJECTION INTRAMUSCULAR; INTRAVENOUS; SUBCUTANEOUS ONCE
Refills: 0 | Status: COMPLETED | OUTPATIENT
Start: 2022-07-05 | End: 2022-07-05

## 2022-07-05 RX ADMIN — SODIUM CHLORIDE 1000 MILLILITER(S): 9 INJECTION INTRAMUSCULAR; INTRAVENOUS; SUBCUTANEOUS at 16:24

## 2022-07-05 RX ADMIN — Medication 100 MILLIGRAM(S): at 16:25

## 2022-07-05 RX ADMIN — Medication 400 MILLIGRAM(S): at 16:25

## 2022-07-05 NOTE — ED PROVIDER NOTE - NS ED ATTENDING STATEMENT MOD
This was a shared visit with the GALA. I reviewed and verified the documentation and independently performed the documented:

## 2022-07-05 NOTE — CONSULT NOTE PEDS - SUBJECTIVE AND OBJECTIVE BOX
CC: Patient presents with tooth pain in LR with associated facial and gingival swelling.    HPI: Patient reports he fractured tooth eating a nut last week and has had pain since Friday 7/1. Pt reports being swollen since Sunday. Throbbing pain to the LR second premolar area with associated facial swelling extending from tooth #28-30. Pt reports chills but no fever. No difficulty breathing. Pt reports pain upon biting. Denies trouble opening.    Med HX: hypertension, heart failure in 2019- no surgery, pt denies smoking.    Allergies: No Known Allergies    RX: Furosemide, entresio, carvedilol, ambien    EOE: (+) swelling   TMJ (WNL)  Trismus (-)  LAD (-)  Dysphagia (-)    IOE: Permanent dentition. (+) swelling. (+) palpation.  Hard/Soft palate (WNL)  Tongue/Floor of Mouth (WNL)  Maxillary Buccal Susan  Mandibular Lingual and Buccal Susan  Tooth #29: Percussion (+) Mobility (+)     Radiographs: PAN and PA taken and interpreted, revealing gross dental caries on distal of tooth #29 extending below alveolar crest and fractured tooth #29.    Assessment: Gross caries tooth #29 and fracture with associated fluctuant swelling extending from tooth #28-30.    Treatment: Discussed radiographic and clinical findings with patient. Discussed RBA of recommended treatment. Obtained verbal consent. Applied 18% benzocaine. Administered carpules 2% lidocaine 1:100k epi via GEREMIAS block and 4% septocaine 1:100k epi via local infiltration with changing of needles after each administration. Incised to bone, dissected fascial planes, hemopurulence appreciated. Irrigated copiously with sterline saline. Hemostasis achieved. POIG. All questions answered.    Recommendations:   1. OTC pain medication as needed.  2. Augmentin for 7 days  3. F/U w/ outpatient dentist or [facility] for extraction tooth #29.      Elida Crawford DDS [id number]  Lyn Kumar, DDS [id number]

## 2022-07-05 NOTE — ED PROVIDER NOTE - PATIENT PORTAL LINK FT
You can access the FollowMyHealth Patient Portal offered by [facility] by registering at the following website: [email or web address]. By joining [facility]’s FollowMyHealth portal, you will also be able to view your health information using other applications (apps) compatible with our system.

## 2022-07-05 NOTE — ED PROVIDER NOTE - NSFOLLOWUPINSTRUCTIONS_ED_ALL_ED_FT
1. Follow up with your PCP within 2-3 days. Go to scheduled dental appointment tomorrow.   [facility]  Dentistry  .  [place] [address]  Phone: [phone]  Fax:         2. Begin taking Augmentin every 12 hours for 10 days.   3. Take Ibuprofen (i.e. Motrin, Advil) 600mg every 8 hrs for pain as needed. Take with food.   May alternate with Acetminophen (Tylenol) 650mg every 6 hours for pain as needed.  4. Return to the emergency department if you develop worsening pain, swelling, inability to swallow, fevers, or any other concerning symptoms.

## 2022-07-05 NOTE — ED PROVIDER NOTE - OBJECTIVE STATEMENT
51 y/o male with PMhx of HTN, CHF presents to the ED complaining of right sided facial swelling x 3 days. Patient states that recently he bit down on a peanut and fractured one of his lower teeth on the right side of his mouth. He states that over the weekend the right side of his face became red, hot, swollen and painful. These symptoms worsened since onset. This AM patient states that it is hard for him to open his mouth or speak due to pain. He called his dentist and was told to go the ED for evaluation. Took motrin this AM but it did not help with his pain. Denies any recent illness, fever, chills, chest pain, cough, n/v/d. No difficulty swallowing. No voice changes.

## 2022-07-05 NOTE — ED PROVIDER NOTE - PROGRESS NOTE DETAILS
Patient evaluated by dental. They stated that patient may be discharged on Augmentin and will follow up with his own dentist tomorrow as scheduled. Will send rx to pharmacy. Supportive care discussed. Nila Luna PA-C

## 2022-07-05 NOTE — ED PROVIDER NOTE - PHYSICAL EXAMINATION
CONSTITUTIONAL: Patient is awake, alert and oriented x 3. Patient is in acute distress due to pain;   HEAD: (+) erythema/edema to right side of face/neck ;  EYES: PERRL bilaterally,   ENT: Airway patent, Nasal mucosa clear. (+) poor dentition multiple dental carries throughout mouth, (+) edema with pus to right lower gums;   NECK: Supple,   LUNGS: CTA B/L,  HEART: RRR.+S1S2  MSK: FROM upper and lower ext b/l,   SKIN: No rash or lesions  NEURO: No focal deficits,

## 2022-07-05 NOTE — ED PROVIDER NOTE - ATTENDING APP SHARED VISIT CONTRIBUTION OF CARE
53 y/o male with PMhx of HTN, CHF presents to the ED complaining of right sided facial swelling x 3 days in the setting of a poor dentition with swelling r lower jaw with flocuence, no fevers, pain control, no trismus, labs, iv abx, dental consult. 51 y/o male with PMhx of HTN, CHF presents to the ED complaining of right sided facial swelling x 3 days in the setting of a poor dentition with swelling r lower jaw with flocuence, no fevers, pain control, no trismus, labs, iv abx, dental consult.    I, Aracely Sierra, performed a history and physical exam of the patient and discussed their management with the resident and /or advanced care provider. I reviewed the resident and /or ACP's note and agree with the documented findings and plan of care. I was present and available for all procedures.

## 2022-10-13 PROBLEM — I51.9 SEVERE LEFT VENTRICULAR SYSTOLIC DYSFUNCTION: Status: ACTIVE | Noted: 2019-01-04

## 2023-01-10 NOTE — ED ADULT NURSE NOTE - FINAL NURSING ELECTRONIC SIGNATURE
05-Jul-2022 20:00 Ivermectin Counseling:  Patient instructed to take medication on an empty stomach with a full glass of water.  Patient informed of potential adverse effects including but not limited to nausea, diarrhea, dizziness, itching, and swelling of the extremities or lymph nodes.  The patient verbalized understanding of the proper use and possible adverse effects of ivermectin.  All of the patient's questions and concerns were addressed.

## 2023-02-05 NOTE — PATIENT PROFILE ADULT - NSTOBACCOCESSATIONEDU1_GEN_A_NUR
1145: RN MERNA called [facility] for follow up. No answer.  RN CM to call [facility] tomorrow.    Recognize danger situations.  For example, stress, drinking alcohol, urges to smoke, smoking cues, cigarette availability

## 2024-04-01 PROBLEM — I10 ESSENTIAL (PRIMARY) HYPERTENSION: Chronic | Status: ACTIVE | Noted: 2022-07-05

## 2024-04-08 ENCOUNTER — APPOINTMENT (OUTPATIENT)
Dept: VASCULAR SURGERY | Facility: CLINIC | Age: 54
End: 2024-04-08
Payer: COMMERCIAL

## 2024-04-08 VITALS
SYSTOLIC BLOOD PRESSURE: 105 MMHG | HEART RATE: 71 BPM | TEMPERATURE: 97.8 F | DIASTOLIC BLOOD PRESSURE: 77 MMHG | WEIGHT: 225 LBS | HEIGHT: 72 IN | BODY MASS INDEX: 30.48 KG/M2

## 2024-04-08 VITALS — HEART RATE: 69 BPM | DIASTOLIC BLOOD PRESSURE: 75 MMHG | SYSTOLIC BLOOD PRESSURE: 104 MMHG

## 2024-04-08 PROCEDURE — 93970 EXTREMITY STUDY: CPT

## 2024-04-08 PROCEDURE — 99203 OFFICE O/P NEW LOW 30 MIN: CPT

## 2024-04-08 PROCEDURE — 93971 EXTREMITY STUDY: CPT | Mod: RT,59

## 2024-04-08 RX ORDER — VITAMIN B COMPLEX
CAPSULE ORAL
Refills: 0 | Status: ACTIVE | COMMUNITY

## 2024-04-08 RX ORDER — APIXABAN 5 MG/1
TABLET, FILM COATED ORAL
Refills: 0 | Status: ACTIVE | COMMUNITY

## 2024-11-25 ENCOUNTER — APPOINTMENT (OUTPATIENT)
Dept: VASCULAR SURGERY | Facility: CLINIC | Age: 54
End: 2024-11-25
Payer: COMMERCIAL

## 2024-11-25 VITALS
TEMPERATURE: 97.6 F | SYSTOLIC BLOOD PRESSURE: 104 MMHG | WEIGHT: 225 LBS | HEART RATE: 74 BPM | DIASTOLIC BLOOD PRESSURE: 76 MMHG | HEIGHT: 72 IN | BODY MASS INDEX: 30.48 KG/M2

## 2024-11-25 DIAGNOSIS — I82.721 CHRONIC EMBOLISM AND THROMBOSIS OF DEEP VEINS OF RIGHT UPPER EXTREMITY: ICD-10-CM

## 2024-11-25 DIAGNOSIS — I82.592 CHRONIC EMBOLISM AND THROMBOSIS OF OTHER SPECIFIED DEEP VEIN OF LEFT LOWER EXTREMITY: ICD-10-CM

## 2024-11-25 PROCEDURE — 99214 OFFICE O/P EST MOD 30 MIN: CPT

## 2024-11-25 PROCEDURE — 93971 EXTREMITY STUDY: CPT

## 2024-12-16 ENCOUNTER — APPOINTMENT (OUTPATIENT)
Dept: WOUND CARE | Facility: CLINIC | Age: 54
End: 2024-12-16

## 2024-12-16 DIAGNOSIS — B35.1 TINEA UNGUIUM: ICD-10-CM

## 2024-12-16 DIAGNOSIS — L60.1 ONYCHOLYSIS: ICD-10-CM

## 2024-12-16 DIAGNOSIS — Z86.718 PERSONAL HISTORY OF OTHER VENOUS THROMBOSIS AND EMBOLISM: ICD-10-CM

## 2024-12-16 PROCEDURE — 99203 OFFICE O/P NEW LOW 30 MIN: CPT

## 2024-12-25 PROBLEM — F10.90 ALCOHOL USE: Status: ACTIVE | Noted: 2019-01-04

## 2025-05-18 NOTE — PROGRESS NOTE ADULT - REASON FOR ADMISSION
shortness of breath
Initial (On Arrival)

## 2025-05-28 ENCOUNTER — APPOINTMENT (OUTPATIENT)
Dept: VASCULAR SURGERY | Facility: CLINIC | Age: 55
End: 2025-05-28